# Patient Record
Sex: FEMALE | Race: WHITE | NOT HISPANIC OR LATINO | Employment: OTHER | URBAN - METROPOLITAN AREA
[De-identification: names, ages, dates, MRNs, and addresses within clinical notes are randomized per-mention and may not be internally consistent; named-entity substitution may affect disease eponyms.]

---

## 2023-08-08 ENCOUNTER — HOSPITAL ENCOUNTER (OUTPATIENT)
Dept: DATA CONVERSION | Facility: HOSPITAL | Age: 73
Discharge: HOME | End: 2023-08-08

## 2023-08-08 DIAGNOSIS — Z87.440 PERSONAL HISTORY OF URINARY (TRACT) INFECTIONS: ICD-10-CM

## 2023-08-08 DIAGNOSIS — Z79.891 LONG TERM (CURRENT) USE OF OPIATE ANALGESIC: ICD-10-CM

## 2023-08-08 DIAGNOSIS — K21.9 GASTRO-ESOPHAGEAL REFLUX DISEASE WITHOUT ESOPHAGITIS: ICD-10-CM

## 2023-08-08 DIAGNOSIS — I70.213 ATHEROSCLEROSIS OF NATIVE ARTERIES OF EXTREMITIES WITH INTERMITTENT CLAUDICATION, BILATERAL LEGS (CMS-HCC): ICD-10-CM

## 2023-08-08 DIAGNOSIS — Z79.82 LONG TERM (CURRENT) USE OF ASPIRIN: ICD-10-CM

## 2023-08-08 DIAGNOSIS — I70.1 ATHEROSCLEROSIS OF RENAL ARTERY (CMS-HCC): ICD-10-CM

## 2023-08-08 DIAGNOSIS — I25.10 ATHEROSCLEROTIC HEART DISEASE OF NATIVE CORONARY ARTERY WITHOUT ANGINA PECTORIS: ICD-10-CM

## 2023-08-08 DIAGNOSIS — F17.200 NICOTINE DEPENDENCE, UNSPECIFIED, UNCOMPLICATED: ICD-10-CM

## 2023-08-08 DIAGNOSIS — E66.9 OBESITY, UNSPECIFIED: ICD-10-CM

## 2023-08-08 DIAGNOSIS — Z79.899 OTHER LONG TERM (CURRENT) DRUG THERAPY: ICD-10-CM

## 2023-08-08 DIAGNOSIS — I86.8 VARICOSE VEINS OF OTHER SPECIFIED SITES: ICD-10-CM

## 2023-08-08 DIAGNOSIS — J44.9 CHRONIC OBSTRUCTIVE PULMONARY DISEASE, UNSPECIFIED (MULTI): ICD-10-CM

## 2023-08-08 DIAGNOSIS — Z95.5 PRESENCE OF CORONARY ANGIOPLASTY IMPLANT AND GRAFT: ICD-10-CM

## 2023-08-08 DIAGNOSIS — I12.9 HYPERTENSIVE CHRONIC KIDNEY DISEASE WITH STAGE 1 THROUGH STAGE 4 CHRONIC KIDNEY DISEASE, OR UNSPECIFIED CHRONIC KIDNEY DISEASE: ICD-10-CM

## 2023-08-08 DIAGNOSIS — I65.23 OCCLUSION AND STENOSIS OF BILATERAL CAROTID ARTERIES: ICD-10-CM

## 2023-08-08 DIAGNOSIS — L40.9 PSORIASIS, UNSPECIFIED: ICD-10-CM

## 2023-08-08 DIAGNOSIS — Z95.828 PRESENCE OF OTHER VASCULAR IMPLANTS AND GRAFTS: ICD-10-CM

## 2023-08-08 DIAGNOSIS — E78.00 PURE HYPERCHOLESTEROLEMIA, UNSPECIFIED: ICD-10-CM

## 2023-08-08 DIAGNOSIS — G62.9 POLYNEUROPATHY, UNSPECIFIED: ICD-10-CM

## 2023-08-08 DIAGNOSIS — N18.4 CHRONIC KIDNEY DISEASE, STAGE 4 (SEVERE) (MULTI): ICD-10-CM

## 2023-08-08 DIAGNOSIS — Z79.51 LONG TERM (CURRENT) USE OF INHALED STEROIDS: ICD-10-CM

## 2023-09-22 ENCOUNTER — HOSPITAL ENCOUNTER (OUTPATIENT)
Dept: DATA CONVERSION | Facility: HOSPITAL | Age: 73
Discharge: HOME | End: 2023-09-22

## 2023-09-22 DIAGNOSIS — I25.10 ATHEROSCLEROTIC HEART DISEASE OF NATIVE CORONARY ARTERY WITHOUT ANGINA PECTORIS: ICD-10-CM

## 2023-09-22 LAB — CK SERPL-CCNC: 44 U/L (ref 24–195)

## 2024-03-25 ENCOUNTER — HOSPITAL ENCOUNTER (OUTPATIENT)
Dept: RADIOLOGY | Facility: HOSPITAL | Age: 74
Discharge: HOME | End: 2024-03-25
Payer: MEDICARE

## 2024-03-25 ENCOUNTER — LAB (OUTPATIENT)
Dept: LAB | Facility: LAB | Age: 74
End: 2024-03-25
Payer: MEDICARE

## 2024-03-25 DIAGNOSIS — N18.32 CHRONIC KIDNEY DISEASE, STAGE 3B (MULTI): ICD-10-CM

## 2024-03-25 DIAGNOSIS — N18.32 CHRONIC KIDNEY DISEASE, STAGE 3B (MULTI): Primary | ICD-10-CM

## 2024-03-25 LAB
ALBUMIN SERPL-MCNC: 4.2 G/DL (ref 3.5–5)
ANION GAP SERPL CALC-SCNC: 12 MMOL/L
APPEARANCE UR: CLEAR
BACTERIA #/AREA URNS AUTO: ABNORMAL /HPF
BILIRUB UR STRIP.AUTO-MCNC: NEGATIVE MG/DL
BUN SERPL-MCNC: 22 MG/DL (ref 8–25)
CALCIUM SERPL-MCNC: 9.1 MG/DL (ref 8.5–10.4)
CHLORIDE SERPL-SCNC: 96 MMOL/L (ref 97–107)
CO2 SERPL-SCNC: 26 MMOL/L (ref 24–31)
COLOR UR: COLORLESS
CREAT SERPL-MCNC: 1.4 MG/DL (ref 0.4–1.6)
CREAT UR-MCNC: 32.1 MG/DL
EGFRCR SERPLBLD CKD-EPI 2021: 40 ML/MIN/1.73M*2
GLUCOSE SERPL-MCNC: 87 MG/DL (ref 65–99)
GLUCOSE UR STRIP.AUTO-MCNC: NORMAL MG/DL
KETONES UR STRIP.AUTO-MCNC: NEGATIVE MG/DL
LEUKOCYTE ESTERASE UR QL STRIP.AUTO: ABNORMAL
MICROALBUMIN UR-MCNC: 308 MG/L (ref 0–23)
MICROALBUMIN/CREAT UR: 959.5 UG/MG CREAT
NITRITE UR QL STRIP.AUTO: NEGATIVE
PH UR STRIP.AUTO: 6 [PH]
PHOSPHATE SERPL-MCNC: 3.8 MG/DL (ref 2.5–4.5)
POTASSIUM SERPL-SCNC: 5.4 MMOL/L (ref 3.4–5.1)
PROT UR STRIP.AUTO-MCNC: ABNORMAL MG/DL
RBC # UR STRIP.AUTO: NEGATIVE /UL
RBC #/AREA URNS AUTO: ABNORMAL /HPF
SODIUM SERPL-SCNC: 134 MMOL/L (ref 133–145)
SP GR UR STRIP.AUTO: 1.01
SQUAMOUS #/AREA URNS AUTO: ABNORMAL /HPF
UROBILINOGEN UR STRIP.AUTO-MCNC: NORMAL MG/DL
WBC #/AREA URNS AUTO: ABNORMAL /HPF

## 2024-03-25 PROCEDURE — 76770 US EXAM ABDO BACK WALL COMP: CPT

## 2024-03-25 PROCEDURE — 81001 URINALYSIS AUTO W/SCOPE: CPT

## 2024-03-25 PROCEDURE — 36415 COLL VENOUS BLD VENIPUNCTURE: CPT

## 2024-03-25 PROCEDURE — 80069 RENAL FUNCTION PANEL: CPT

## 2024-03-25 PROCEDURE — 76770 US EXAM ABDO BACK WALL COMP: CPT | Performed by: STUDENT IN AN ORGANIZED HEALTH CARE EDUCATION/TRAINING PROGRAM

## 2024-03-25 PROCEDURE — 82043 UR ALBUMIN QUANTITATIVE: CPT

## 2024-03-25 PROCEDURE — 82570 ASSAY OF URINE CREATININE: CPT

## 2024-03-26 DIAGNOSIS — R94.31 ABNORMAL EKG: ICD-10-CM

## 2024-03-26 DIAGNOSIS — I10 PRIMARY HYPERTENSION: ICD-10-CM

## 2024-03-26 RX ORDER — CLONIDINE HYDROCHLORIDE 0.1 MG/1
0.1 TABLET ORAL 3 TIMES DAILY
Qty: 270 TABLET | Refills: 3 | Status: SHIPPED | OUTPATIENT
Start: 2024-03-26 | End: 2025-03-21

## 2024-03-26 NOTE — TELEPHONE ENCOUNTER
Pharmacy is requesting a refill for the pt     Requested Prescriptions     Pending Prescriptions Disp Refills    cloNIDine (Catapres) 0.1 mg tablet 270 tablet 3     Sig: TAKE 1 TABLET BY MOUTH THREE TIMES A DAY

## 2024-04-11 ENCOUNTER — HOSPITAL ENCOUNTER (OUTPATIENT)
Dept: RADIOLOGY | Facility: HOSPITAL | Age: 74
Discharge: HOME | End: 2024-04-11
Payer: MEDICARE

## 2024-04-11 DIAGNOSIS — F17.200 NICOTINE DEPENDENCE, UNSPECIFIED, UNCOMPLICATED: ICD-10-CM

## 2024-04-11 PROCEDURE — 71271 CT THORAX LUNG CANCER SCR C-: CPT

## 2024-05-05 NOTE — PROGRESS NOTES
Subjective   Soledad Macdonald is a 73 y.o. female who presents for NPV TO ESTABLISH PCP CARE and FOR CARE GAP REVIEW.      HPI:      74 yo female CURRENT SMOKER (1/2 ppd x 1 years + 2 ppd x 50 years= 101 pack-years) who presents for NPV TO ESTABLISH PCP CARE and FOR CARE GAP REVIEW.       [facility] records reviewed.    PMHx:  HTN; followed by cardiology  CKD Stage 3 B; GFR 40 (3/25/24)  Arthrosclerosis/CAD; extremely elevated coronary calcium score Total Agatston score 2960 (CT Heart Ca scoring 3/17/23); followed by cardiology  Pulmonary nodules up to 5 mm on CT Lung cancer screening 4/11/24  Severe atherosclerotic calcification of the thoracic aorta with areas of mild aneurysmal dilatation in the arch and descending thoracic aorta on CT Lung cancer screening 4/11/24  Dilated main pulmonary artery which can be seen in pulmonary artery hypertension on CT Lung cancer screening 4/11/24  Severe Right renal atrophy    Renal US 3/25/24:  IMPRESSION:  Severe right renal atrophy.  Multiple simple left renal cysts. No renal calculi or hydronephrosis.    CT Lung Cancer Screening 4/11/24:  IMPRESSION:  1. Couple of nodules in the left lower lobe measuring up to 5 mm as  described above. Continued screening with low-dose noncontrast chest  CT in 12 months (from current date) is recommended.  2. Mild upper lung predominant emphysema.  3. Severe atherosclerotic calcification of the thoracic aorta with  areas of mild aneurysmal dilatation in the arch and descending  thoracic aorta. Recommend dedicated CT angiography of the thoracic  aorta.  4. Dilated main pulmonary artery which can be seen in pulmonary  artery hypertension.  5. Severe coronary artery calcification. Estimated coronary artery  calcium score 957. Correlate with coronary artery disease risk  factors.  6. Cholelithiasis.        Healthcare Providers:  Cardiology (primary): Dr. Nunez  Nephrology: Dr. Canchola  Vascular surgery: Dr. Gibbons  Cardiology:   "[facility]  Pulmonology: Dr. Neil   Prior PCP:  Dr Mims      Preventive Health Services:  -Last physical:  -last mammogram: ? NOW DUE  -last colonoscopy: ?  -last STI screening: ?  -Hep C screening: ?  -DEXA:  NOW DUE    CT Lung Cancer Screening 4/11/24:  IMPRESSION:  1. Couple of nodules in the left lower lobe measuring up to 5 mm as  described above. Continued screening with low-dose noncontrast chest  CT in 12 months (from current date) is recommended.  2. Mild upper lung predominant emphysema.  3. Severe atherosclerotic calcification of the thoracic aorta with  areas of mild aneurysmal dilatation in the arch and descending  thoracic aorta. Recommend dedicated CT angiography of the thoracic  aorta.  4. Dilated main pulmonary artery which can be seen in pulmonary  artery hypertension.  5. Severe coronary artery calcification. Estimated coronary artery  calcium score 957. Correlate with coronary artery disease risk  factors.  6. Cholelithiasis.    Ct Heart Ca scoring 3/17/23:  FINDINGS:  The Agatston Score as reported in this table provides a measure for  comparison with published studies (see \"Percentile Ranking\" below). The  Volume Score can be useful for comparison with follow-up examinations, and  is reported here:  2404 mm.        CORONARY ARTERY  SCORE  -----------------------------------------------------  Left Main (LM) 241  Left Anterior Descending (LAD) 597  Left Circumflex (LCX) 225  Right Coronary Artery (RCA) 1896  Total Agatston score 2960              Immunizations:   -Childhood vaccines: completed per patient    -COVID vaccine and booster:  -updated COVID spike vaccine: NOW DUE  -Flu vaccine: NOW DUE  -TDAP:  ? NOW DUE  -pneumonia: ? NOW DUE  -RSV: NOW DUE    There is no immunization history on file for this patient.      Today Soledad reports:    - doing and feeling well.     -taking all medications as prescribed with no reported adverse medication side effects      Today she has no other reported " "complaints, issues, or problems.    ROS is NEG for HEADACHE, NAUSEA, VOMITING, DIARRHEA, CHEST PAIN, SOB, and BLEEDING.  Review of systems (10+) is negative for all systems except for any identified issues in HPI above.        Objective     /80   Pulse 105   Temp 36.4 °C (97.6 °F)   Resp 22   Ht 1.676 m (5' 6\")   Wt 86.5 kg (190 lb 9.6 oz)   SpO2 96%   BMI 30.76 kg/m²      Physical Examination:       GENERAL           General Appearance: well-appearing, well-developed, well-hydrated, well-nourished, no acute distress.        HEENT           NECK supple, no masses or thyromegaly, no carotid bruit.        EYES           Extraocular Movements: normal, bilateral eyes BING, no conjunctival injection.        HEART           Rate and Rhythm regular rate and rhythm. Heart sounds: normal S1S2, no S3 or S4. Murmurs: none.        CHEST           Breath sounds: Clear to IPPA, RR<16 no use of accessory muscles.        ABDOMEN           General: Neg for LKKS or masses, no scleral icterus or jaundice.        MUSCULOSKELETAL           Joints Demonstration: Neg for erythema, swelling or joint deformities. gross abnormalities no gross abnormalities.        EXTREMITIES           Lower Extremities: Neg for cyanosis, clubbing or edema.        SKIN: Eczema plaques on hands and fingers bilaterally. No signs of cellulitis or infection      Assessment/Plan   Problem List Items Addressed This Visit    None  Visit Diagnoses       Encounter to establish care    -  Primary    Aneurysm of descending thoracic aorta without rupture (CMS-HCC)        Primary hypertension        Coronary artery disease involving native heart, unspecified vessel or lesion type, unspecified whether angina present        Relevant Medications    clopidogrel (Plavix) 75 mg tablet    Pulmonary nodules        Stage 3b chronic kidney disease (Multi)        Dilation of pulmonary artery (Multi)        Pulmonary emphysema, unspecified emphysema type (Multi)        " Calculus of gallbladder without cholecystitis without obstruction        Encounter for screening mammogram for malignant neoplasm of breast        Age related osteoporosis, unspecified pathological fracture presence        Vitamin D deficiency        Encounter for hepatitis C screening test for low risk patient        Routine screening for STI (sexually transmitted infection)        Diabetes mellitus screening        Colon cancer screening              Establish PCP care  -labs ordered (see A/P above for details)    HTN: followed by cardiology  -CMP, UA ordered  -cont BP medications and management per cardiology Dr. Thakkar  -low salt, low cholesterol, low fat diet, regularly exercise, and limit alcohol intake    Pulmonary nodules on CT Lung cancer screening 4/11/24  -referral to pulmonology ordered for evaluation and management    CKD Stage 3 B and severe right renal atrophy: followed by nephrology  -CMP, UA ordered  -cont management per nephrology  -patient counseled to AVOID NSAIDS to preserve renal function  -low salt, low cholesterol, low fat diet, regularly exercise, and limit alcohol intake    Arthrosclerosis/CAD; extremely elevated coronary calcium score : Total Agatston score 2960 (CT Heart Ca scoring 3/17/23); followed by cardiology  -cont management and medications per cardiology  -Emergency Dept and 911/EMS cardiac precautions discussed and reviewed with patient    Severe atherosclerotic calcification of the thoracic aorta with areas of mild aneurysmal dilatation in the arch and descending thoracic aorta on CT Lung cancer screening 4/11/24  -referral to cardiovascular surgery ordered  -Emergency Dept and 911/EMS cardiac precautions discussed and reviewed with patient    Dilated main pulmonary artery which can be seen in pulmonary artery hypertension on CT Lung cancer screening 4/11/24  -referral to cardiology and pulmonology ordered; patient advised to discuss with her cardiologist     Eczema:  -referral  to dermatology ordered  -cont triamcinolone as needed    Lipid Disorder screening  -lipid panel ordered    Diabetes Screening  -HgBA1c ordered    Vitamin D deficiency  -Vit D levels ordered     Hep C screening  -Hep C antibody ordered     STI Screening:  -HIV, syphilis, GC/CT, trich ordered    Breast Cancer Screening: MAMMOGRAM NOW DUE   -mammogram ordered     Colon Cancer Screening: NOW DUE   -colonoscopy ordered     Age related osteoporosis:   -DEXA ordered    Counseling:       Medication education:         Education:  The patient is counseled regarding potential side-effects of all new medications        Understanding:  Patient expressed understanding        Adherence:  No barriers to adherence identified        Immunizations Counseling  -pneumonia, shingles, and TDAP now due==> PREVNAR 20 RECEIVED TODAY   -recommend updated COVID spike vaccine that can be obtained at local pharmacy     FOLLOW-UP: 4 weeks to discuss and review test results and 8 weeks for PHYSICAL     Discussed recommended plan of care with patient. Patient expressed understanding and agreement with plan of care. All of patient's questions were answered at the time. Patient had no additional questions at the time.           Katheryn Gallegos MD, PhD

## 2024-05-05 NOTE — PATIENT INSTRUCTIONS
It was nice meeting you today.    Please go to [facility] lab or another [facility] lab facility to complete the lab testing that I ordered      Please call radiology to schedule : 1) mammogram and 2) DEXA bone density scan      Please call referral line to schedule: 1) pulmonology for pulmonary nodules and dilated pulmonary artery; 2) cardiology for dilated pulmonary artery; 3) cardiovascular surgery for thoracic artery aneurysm  And 4) dermatology for eczema. You can also call [facility] dermatology to be seen, as long as they take your insurance,     If you are seeing the above specialists please call and schedule follow up appointments with them to discuss.    Please continue to follow with nephrology for kidney impairment and cardiology for high blood pressure and cardiac issues.      I recommend receiving the pneumonia, TDAP booster that prevents tetanus and other infectious disease, shingles vaccine. You received Prevnar 20 pneumonia vaccine today    I recommend the updated COVID vaccine and RSV vaccine that can be obtained at your local pharmacy    If you develop chest pain, heart palpitations, or pass out please immediately call 911.    Please schedule a follow up with me in 4  weeks to discuss and review your test results

## 2024-05-06 ENCOUNTER — OFFICE VISIT (OUTPATIENT)
Dept: PRIMARY CARE | Facility: CLINIC | Age: 74
End: 2024-05-06
Payer: MEDICARE

## 2024-05-06 VITALS
HEART RATE: 105 BPM | OXYGEN SATURATION: 96 % | DIASTOLIC BLOOD PRESSURE: 80 MMHG | TEMPERATURE: 97.6 F | RESPIRATION RATE: 22 BRPM | SYSTOLIC BLOOD PRESSURE: 136 MMHG | HEIGHT: 66 IN | BODY MASS INDEX: 30.63 KG/M2 | WEIGHT: 190.6 LBS

## 2024-05-06 DIAGNOSIS — R53.83 OTHER FATIGUE: ICD-10-CM

## 2024-05-06 DIAGNOSIS — Z23 ENCOUNTER FOR ADMINISTRATION OF VACCINE: ICD-10-CM

## 2024-05-06 DIAGNOSIS — E55.9 VITAMIN D DEFICIENCY: ICD-10-CM

## 2024-05-06 DIAGNOSIS — Z12.31 ENCOUNTER FOR SCREENING MAMMOGRAM FOR MALIGNANT NEOPLASM OF BREAST: ICD-10-CM

## 2024-05-06 DIAGNOSIS — Z11.3 ROUTINE SCREENING FOR STI (SEXUALLY TRANSMITTED INFECTION): ICD-10-CM

## 2024-05-06 DIAGNOSIS — I10 PRIMARY HYPERTENSION: ICD-10-CM

## 2024-05-06 DIAGNOSIS — Z13.1 DIABETES MELLITUS SCREENING: ICD-10-CM

## 2024-05-06 DIAGNOSIS — I28.8 DILATION OF PULMONARY ARTERY (MULTI): ICD-10-CM

## 2024-05-06 DIAGNOSIS — K80.20 CALCULUS OF GALLBLADDER WITHOUT CHOLECYSTITIS WITHOUT OBSTRUCTION: ICD-10-CM

## 2024-05-06 DIAGNOSIS — L30.9 ECZEMA, UNSPECIFIED TYPE: ICD-10-CM

## 2024-05-06 DIAGNOSIS — Z11.59 ENCOUNTER FOR HEPATITIS C SCREENING TEST FOR LOW RISK PATIENT: ICD-10-CM

## 2024-05-06 DIAGNOSIS — Z76.89 ENCOUNTER TO ESTABLISH CARE: Primary | ICD-10-CM

## 2024-05-06 DIAGNOSIS — R73.09 ELEVATED GLUCOSE: ICD-10-CM

## 2024-05-06 DIAGNOSIS — J43.9 PULMONARY EMPHYSEMA, UNSPECIFIED EMPHYSEMA TYPE (MULTI): ICD-10-CM

## 2024-05-06 DIAGNOSIS — N18.32 STAGE 3B CHRONIC KIDNEY DISEASE (MULTI): ICD-10-CM

## 2024-05-06 DIAGNOSIS — M81.0 AGE RELATED OSTEOPOROSIS, UNSPECIFIED PATHOLOGICAL FRACTURE PRESENCE: ICD-10-CM

## 2024-05-06 DIAGNOSIS — I71.23 ANEURYSM OF DESCENDING THORACIC AORTA WITHOUT RUPTURE (CMS-HCC): ICD-10-CM

## 2024-05-06 DIAGNOSIS — I25.10 CORONARY ARTERY DISEASE INVOLVING NATIVE HEART, UNSPECIFIED VESSEL OR LESION TYPE, UNSPECIFIED WHETHER ANGINA PRESENT: ICD-10-CM

## 2024-05-06 DIAGNOSIS — R91.8 PULMONARY NODULES: ICD-10-CM

## 2024-05-06 DIAGNOSIS — Z12.11 COLON CANCER SCREENING: ICD-10-CM

## 2024-05-06 DIAGNOSIS — Z76.0 MEDICATION REFILL: ICD-10-CM

## 2024-05-06 PROCEDURE — 1159F MED LIST DOCD IN RCRD: CPT | Performed by: FAMILY MEDICINE

## 2024-05-06 PROCEDURE — 4004F PT TOBACCO SCREEN RCVD TLK: CPT | Performed by: FAMILY MEDICINE

## 2024-05-06 PROCEDURE — 1126F AMNT PAIN NOTED NONE PRSNT: CPT | Performed by: FAMILY MEDICINE

## 2024-05-06 PROCEDURE — 99214 OFFICE O/P EST MOD 30 MIN: CPT | Performed by: FAMILY MEDICINE

## 2024-05-06 PROCEDURE — 3008F BODY MASS INDEX DOCD: CPT | Performed by: FAMILY MEDICINE

## 2024-05-06 PROCEDURE — 90677 PCV20 VACCINE IM: CPT | Performed by: FAMILY MEDICINE

## 2024-05-06 PROCEDURE — 99204 OFFICE O/P NEW MOD 45 MIN: CPT | Performed by: FAMILY MEDICINE

## 2024-05-06 PROCEDURE — 3075F SYST BP GE 130 - 139MM HG: CPT | Performed by: FAMILY MEDICINE

## 2024-05-06 PROCEDURE — 3079F DIAST BP 80-89 MM HG: CPT | Performed by: FAMILY MEDICINE

## 2024-05-06 RX ORDER — ROSUVASTATIN CALCIUM 5 MG/1
5 TABLET, COATED ORAL DAILY
Qty: 90 TABLET | Refills: 3 | Status: SHIPPED | OUTPATIENT
Start: 2024-05-06 | End: 2024-05-23 | Stop reason: SDUPTHER

## 2024-05-06 RX ORDER — ROSUVASTATIN CALCIUM 5 MG/1
5 TABLET, COATED ORAL DAILY
COMMUNITY
End: 2024-05-06 | Stop reason: SDUPTHER

## 2024-05-06 RX ORDER — FLUTICASONE FUROATE, UMECLIDINIUM BROMIDE AND VILANTEROL TRIFENATATE 100; 62.5; 25 UG/1; UG/1; UG/1
1 POWDER RESPIRATORY (INHALATION) DAILY
COMMUNITY

## 2024-05-06 RX ORDER — ALPRAZOLAM 1 MG/1
1 TABLET, ORALLY DISINTEGRATING ORAL 2 TIMES DAILY
COMMUNITY

## 2024-05-06 RX ORDER — FUROSEMIDE 40 MG/1
40 TABLET ORAL 3 TIMES DAILY
COMMUNITY

## 2024-05-06 RX ORDER — CLOPIDOGREL BISULFATE 75 MG/1
75 TABLET ORAL DAILY
COMMUNITY

## 2024-05-06 ASSESSMENT — PATIENT HEALTH QUESTIONNAIRE - PHQ9
1. LITTLE INTEREST OR PLEASURE IN DOING THINGS: NOT AT ALL
SUM OF ALL RESPONSES TO PHQ9 QUESTIONS 1 AND 2: 0
2. FEELING DOWN, DEPRESSED OR HOPELESS: NOT AT ALL

## 2024-05-06 ASSESSMENT — COLUMBIA-SUICIDE SEVERITY RATING SCALE - C-SSRS
6. HAVE YOU EVER DONE ANYTHING, STARTED TO DO ANYTHING, OR PREPARED TO DO ANYTHING TO END YOUR LIFE?: NO
2. HAVE YOU ACTUALLY HAD ANY THOUGHTS OF KILLING YOURSELF?: NO
1. IN THE PAST MONTH, HAVE YOU WISHED YOU WERE DEAD OR WISHED YOU COULD GO TO SLEEP AND NOT WAKE UP?: NO

## 2024-05-06 ASSESSMENT — PAIN SCALES - GENERAL: PAINLEVEL: 0-NO PAIN

## 2024-05-06 ASSESSMENT — ENCOUNTER SYMPTOMS
OCCASIONAL FEELINGS OF UNSTEADINESS: 0
DEPRESSION: 0
LOSS OF SENSATION IN FEET: 0

## 2024-05-11 ENCOUNTER — APPOINTMENT (OUTPATIENT)
Dept: RADIOLOGY | Facility: HOSPITAL | Age: 74
End: 2024-05-11
Payer: MEDICARE

## 2024-05-11 ENCOUNTER — HOSPITAL ENCOUNTER (EMERGENCY)
Facility: HOSPITAL | Age: 74
Discharge: LEFT BEFORE DISCHARGE | End: 2024-05-11
Attending: EMERGENCY MEDICINE
Payer: MEDICARE

## 2024-05-11 ENCOUNTER — APPOINTMENT (OUTPATIENT)
Dept: CARDIOLOGY | Facility: HOSPITAL | Age: 74
End: 2024-05-11
Payer: MEDICARE

## 2024-05-11 VITALS
TEMPERATURE: 97.3 F | WEIGHT: 190 LBS | DIASTOLIC BLOOD PRESSURE: 83 MMHG | RESPIRATION RATE: 20 BRPM | HEART RATE: 130 BPM | BODY MASS INDEX: 30.53 KG/M2 | OXYGEN SATURATION: 95 % | HEIGHT: 66 IN | SYSTOLIC BLOOD PRESSURE: 159 MMHG

## 2024-05-11 DIAGNOSIS — R07.9 CHEST PAIN, UNSPECIFIED TYPE: Primary | ICD-10-CM

## 2024-05-11 LAB
ALBUMIN SERPL-MCNC: 4.3 G/DL (ref 3.5–5)
ALP BLD-CCNC: 75 U/L (ref 35–125)
ALT SERPL-CCNC: 11 U/L (ref 5–40)
ANION GAP SERPL CALC-SCNC: 16 MMOL/L
AST SERPL-CCNC: 21 U/L (ref 5–40)
BASOPHILS # BLD AUTO: 0.04 X10*3/UL (ref 0–0.1)
BASOPHILS NFR BLD AUTO: 0.6 %
BILIRUB SERPL-MCNC: 0.3 MG/DL (ref 0.1–1.2)
BUN SERPL-MCNC: 21 MG/DL (ref 8–25)
CALCIUM SERPL-MCNC: 9.8 MG/DL (ref 8.5–10.4)
CHLORIDE SERPL-SCNC: 93 MMOL/L (ref 97–107)
CO2 SERPL-SCNC: 24 MMOL/L (ref 24–31)
CREAT SERPL-MCNC: 1.5 MG/DL (ref 0.4–1.6)
EGFRCR SERPLBLD CKD-EPI 2021: 37 ML/MIN/1.73M*2
EOSINOPHIL # BLD AUTO: 0.32 X10*3/UL (ref 0–0.4)
EOSINOPHIL NFR BLD AUTO: 4.7 %
ERYTHROCYTE [DISTWIDTH] IN BLOOD BY AUTOMATED COUNT: 13.5 % (ref 11.5–14.5)
GLUCOSE SERPL-MCNC: 131 MG/DL (ref 65–99)
HCT VFR BLD AUTO: 44.7 % (ref 36–46)
HGB BLD-MCNC: 14.1 G/DL (ref 12–16)
IMM GRANULOCYTES # BLD AUTO: 0.01 X10*3/UL (ref 0–0.5)
IMM GRANULOCYTES NFR BLD AUTO: 0.1 % (ref 0–0.9)
LYMPHOCYTES # BLD AUTO: 2.18 X10*3/UL (ref 0.8–3)
LYMPHOCYTES NFR BLD AUTO: 32.3 %
MCH RBC QN AUTO: 29.7 PG (ref 26–34)
MCHC RBC AUTO-ENTMCNC: 31.5 G/DL (ref 32–36)
MCV RBC AUTO: 94 FL (ref 80–100)
MONOCYTES # BLD AUTO: 0.49 X10*3/UL (ref 0.05–0.8)
MONOCYTES NFR BLD AUTO: 7.3 %
NEUTROPHILS # BLD AUTO: 3.71 X10*3/UL (ref 1.6–5.5)
NEUTROPHILS NFR BLD AUTO: 55 %
NRBC BLD-RTO: 0 /100 WBCS (ref 0–0)
NT-PROBNP SERPL-MCNC: 381 PG/ML (ref 0–353)
PLATELET # BLD AUTO: 213 X10*3/UL (ref 150–450)
POTASSIUM SERPL-SCNC: 3.5 MMOL/L (ref 3.4–5.1)
PROT SERPL-MCNC: 8 G/DL (ref 5.9–7.9)
RBC # BLD AUTO: 4.75 X10*6/UL (ref 4–5.2)
SODIUM SERPL-SCNC: 133 MMOL/L (ref 133–145)
TROPONIN T SERPL-MCNC: 20 NG/L
WBC # BLD AUTO: 6.8 X10*3/UL (ref 4.4–11.3)

## 2024-05-11 PROCEDURE — 84484 ASSAY OF TROPONIN QUANT: CPT | Performed by: EMERGENCY MEDICINE

## 2024-05-11 PROCEDURE — 2500000004 HC RX 250 GENERAL PHARMACY W/ HCPCS (ALT 636 FOR OP/ED): Performed by: EMERGENCY MEDICINE

## 2024-05-11 PROCEDURE — 85025 COMPLETE CBC W/AUTO DIFF WBC: CPT | Performed by: EMERGENCY MEDICINE

## 2024-05-11 PROCEDURE — 96361 HYDRATE IV INFUSION ADD-ON: CPT

## 2024-05-11 PROCEDURE — 94640 AIRWAY INHALATION TREATMENT: CPT

## 2024-05-11 PROCEDURE — 36415 COLL VENOUS BLD VENIPUNCTURE: CPT | Performed by: EMERGENCY MEDICINE

## 2024-05-11 PROCEDURE — 93005 ELECTROCARDIOGRAM TRACING: CPT

## 2024-05-11 PROCEDURE — 71045 X-RAY EXAM CHEST 1 VIEW: CPT | Performed by: RADIOLOGY

## 2024-05-11 PROCEDURE — 71275 CT ANGIOGRAPHY CHEST: CPT

## 2024-05-11 PROCEDURE — 96375 TX/PRO/DX INJ NEW DRUG ADDON: CPT | Mod: 59

## 2024-05-11 PROCEDURE — 71045 X-RAY EXAM CHEST 1 VIEW: CPT

## 2024-05-11 PROCEDURE — 80053 COMPREHEN METABOLIC PANEL: CPT | Performed by: EMERGENCY MEDICINE

## 2024-05-11 PROCEDURE — 96374 THER/PROPH/DIAG INJ IV PUSH: CPT | Mod: 59

## 2024-05-11 PROCEDURE — 2550000001 HC RX 255 CONTRASTS: Performed by: EMERGENCY MEDICINE

## 2024-05-11 PROCEDURE — 2500000002 HC RX 250 W HCPCS SELF ADMINISTERED DRUGS (ALT 637 FOR MEDICARE OP, ALT 636 FOR OP/ED): Performed by: EMERGENCY MEDICINE

## 2024-05-11 PROCEDURE — 99285 EMERGENCY DEPT VISIT HI MDM: CPT | Mod: 25

## 2024-05-11 PROCEDURE — 71275 CT ANGIOGRAPHY CHEST: CPT | Performed by: RADIOLOGY

## 2024-05-11 PROCEDURE — 83880 ASSAY OF NATRIURETIC PEPTIDE: CPT | Performed by: EMERGENCY MEDICINE

## 2024-05-11 RX ORDER — IPRATROPIUM BROMIDE AND ALBUTEROL SULFATE 2.5; .5 MG/3ML; MG/3ML
3 SOLUTION RESPIRATORY (INHALATION) ONCE
Status: COMPLETED | OUTPATIENT
Start: 2024-05-11 | End: 2024-05-11

## 2024-05-11 RX ORDER — ONDANSETRON HYDROCHLORIDE 2 MG/ML
4 INJECTION, SOLUTION INTRAVENOUS ONCE
Status: COMPLETED | OUTPATIENT
Start: 2024-05-11 | End: 2024-05-11

## 2024-05-11 RX ORDER — MORPHINE SULFATE 4 MG/ML
4 INJECTION, SOLUTION INTRAMUSCULAR; INTRAVENOUS ONCE
Status: COMPLETED | OUTPATIENT
Start: 2024-05-11 | End: 2024-05-11

## 2024-05-11 RX ORDER — FAMOTIDINE 10 MG/ML
20 INJECTION INTRAVENOUS ONCE
Status: COMPLETED | OUTPATIENT
Start: 2024-05-11 | End: 2024-05-11

## 2024-05-11 RX ORDER — ASPIRIN 325 MG
325 TABLET ORAL ONCE
Status: DISCONTINUED | OUTPATIENT
Start: 2024-05-11 | End: 2024-05-12 | Stop reason: HOSPADM

## 2024-05-11 RX ADMIN — MORPHINE SULFATE 4 MG: 4 INJECTION, SOLUTION INTRAMUSCULAR; INTRAVENOUS at 20:34

## 2024-05-11 RX ADMIN — SODIUM CHLORIDE 500 ML: 900 INJECTION, SOLUTION INTRAVENOUS at 20:35

## 2024-05-11 RX ADMIN — ONDANSETRON 4 MG: 2 INJECTION INTRAMUSCULAR; INTRAVENOUS at 20:35

## 2024-05-11 RX ADMIN — FAMOTIDINE 20 MG: 10 INJECTION INTRAVENOUS at 20:34

## 2024-05-11 RX ADMIN — IOHEXOL 75 ML: 350 INJECTION, SOLUTION INTRAVENOUS at 21:19

## 2024-05-11 RX ADMIN — IPRATROPIUM BROMIDE AND ALBUTEROL SULFATE 3 ML: 2.5; .5 SOLUTION RESPIRATORY (INHALATION) at 20:35

## 2024-05-11 ASSESSMENT — HEART SCORE
ECG: NORMAL
TROPONIN: 1-3 TIMES NORMAL LIMIT
HISTORY: SLIGHTLY SUSPICIOUS
RISK FACTORS: >2 RISK FACTORS OR HX OF ATHEROSCLEROTIC DISEASE
AGE: 65+
HEART SCORE: 5

## 2024-05-11 ASSESSMENT — PAIN DESCRIPTION - LOCATION: LOCATION: CHEST

## 2024-05-11 ASSESSMENT — PAIN DESCRIPTION - DESCRIPTORS
DESCRIPTORS: PRESSURE;HEAVINESS
DESCRIPTORS: PRESSURE

## 2024-05-11 ASSESSMENT — PAIN DESCRIPTION - FREQUENCY: FREQUENCY: CONSTANT/CONTINUOUS

## 2024-05-11 ASSESSMENT — COLUMBIA-SUICIDE SEVERITY RATING SCALE - C-SSRS
6. HAVE YOU EVER DONE ANYTHING, STARTED TO DO ANYTHING, OR PREPARED TO DO ANYTHING TO END YOUR LIFE?: NO
1. IN THE PAST MONTH, HAVE YOU WISHED YOU WERE DEAD OR WISHED YOU COULD GO TO SLEEP AND NOT WAKE UP?: NO
2. HAVE YOU ACTUALLY HAD ANY THOUGHTS OF KILLING YOURSELF?: NO

## 2024-05-11 ASSESSMENT — PAIN DESCRIPTION - PAIN TYPE: TYPE: ACUTE PAIN

## 2024-05-11 ASSESSMENT — PAIN DESCRIPTION - ONSET: ONSET: PROGRESSIVE

## 2024-05-11 ASSESSMENT — PAIN DESCRIPTION - PROGRESSION: CLINICAL_PROGRESSION: NOT CHANGED

## 2024-05-11 ASSESSMENT — PAIN SCALES - GENERAL: PAINLEVEL_OUTOF10: 6

## 2024-05-11 ASSESSMENT — PAIN - FUNCTIONAL ASSESSMENT: PAIN_FUNCTIONAL_ASSESSMENT: 0-10

## 2024-05-12 NOTE — PROGRESS NOTES
Attestation/Supervisory note for JACEK Mishra      The patient is a 73-year-old female presenting to the emergency department for evaluation of substernal chest pain.  She states that has been fluctuating in intensity all day.  No specific better or worse.  No radiation.  She states that she does have some shortness of breath but that is chronic for her because she has emphysema.  She continues to smoke daily.  She denies any sick contacts or recent travel.  No fever or chills.  No headache or visual changes.  No abdominal pain.  No nausea vomiting.  No diarrhea or constipation but no urinary complaints.  No fever or chills.  No recent travel or immobility.  No recent surgery.  She denies any history of CAD or ACS.  No history of PE or DVT.  No history of diabetes.  She states that she does have a history of hypertension and hyperlipidemia.  All pertinent positives and negatives are recorded above.  All other systems reviewed and otherwise negative.  Vital signs with tachycardia and hypertension but otherwise within normal limits.  Physical exam with a well-nourished well-developed female in no acute distress.  HEENT exam with dry mucous membranes but otherwise unremarkable.  She has no evidence of airway compromise or respiratory distress.  Abdominal exam is benign.  She has no gross motor, neurologic or vascular deficits on exam.  She does have bilateral lower extremity pitting edema.  Pulses are equal in all 4 extremities.      EKG with sinus tachycardia 130 bpm, normal axis, normal voltage, normal ST segment, normal T waves.  There are occasional PVCs      Oral aspirin, IV fluids, IV Pepcid, IV morphine, IV Zofran and duoneb ordered.      Diagnostic labs with mild electrolyte imbalance and borderline troponin T but otherwise unremarkable.      Initial Troponin T 20. Repeat trop T pending at the time of my departure      Heart score 4      XR chest 1 view   Final Result   No airspace consolidation or pleural  effusion.        MACRO:   None        Signed by: Yosef Christiansoncher 5/11/2024 9:26 PM   Dictation workstation:   [id number]      CT angio chest for pulmonary embolism    (Results Pending)        The patient does not have any evidence of ischemia/STEMI on EKG but the troponin is borderline.  Repeat troponin T is pending at the time of my departure.  The patient did not have any events on telemetry other than sinus tachycardia.  Chest x-ray without acute process.  No evidence of pneumonia or pneumothorax.  No evidence of CHF.  CT chest was in process at the time of my departure.      JACEK Mishra will continue manage the patient primarily.  Anticipate disposition based on the results of the CT chest and repeat troponin T.  Patient will need to be admitted for further management and trending of her cardiac enzymes if the CT chest does not show any indication for transfer.      Impression/diagnosis  Chest pain, substernal  Hypertension, unspecified  Electrolyte imbalance      Critical care time billing is not warranted at this time        I personally saw the patient and made/approve the management plan and take responsibility for the patient management.      I independently interpreted the following study (S): EKG and diagnostic labs      I personally discussed the patient's management with the patient      I reviewed the results of the diagnostic labs and diagnostic imaging.  Formal radiology read was completed by the radiologist.      Viki Beltre MD

## 2024-05-12 NOTE — DISCHARGE INSTRUCTIONS
Thank you for choosing [facility] and [facility]  for your emergency care.    Please return to the Emergency Department immediately if new or worsening symptoms occur. Symptoms of that are most concerning include worsening chest pain, shortness of breath, lightheadedness or dizziness.    It is important to remember that your care does not end here and you must continue to monitor your condition closely. Please return to the emergency department for any worsening or concerning signs or symptoms as directed by our conversations and the discharge instructions. Otherwise please follow up with your doctor in 2 days if no better or worse. If you do not have a doctor please contact the referral number on your discharge instructions. Please contact any physician specialists provided in your discharge notes as it is very important to follow up with them regarding your condition. If you are unable to reach the physicians provided, please come back to the Emergency Department at any time.      As always, please take medications as directed. If you have any questions at all regarding your medications, please contact the pharmacist, the emergency department, or your doctor. Before taking any medication prescribed in the Emergency Department, please review the medication side effects and drug interactions as they may interact with your home medications.     Education materials have been provided to you about your encounter today.  Please review the attachments at your earliest convenience.

## 2024-05-12 NOTE — ED NOTES
Pt expressed to nurse that she decided to leave AMA because tomorrow is mothers day and she does not want to spend mothers day in the hospital. Pt stated that she will follow up with cardiology on Monday.     Salvador Argueta RN  05/11/24 [id number]

## 2024-05-12 NOTE — ED PROVIDER NOTES
HPI   Chief Complaint   Patient presents with    Chest Pain     Chest pain off and on all day, just got worse about 5 minutes ago. Hx. Heart conditions. And stent placement.       HPI     Patient is a 73-year-old female with a history of hypertension and hyperlipidemia presenting for evaluation of substernal chest pain.  Patient states the pain has been on and off all day.  She admits to associated shortness of breath but states that she is always short of breath because she has emphysema.  He is a current tobacco user.  Nothing makes the pain better or worse.  She has not taken anything for the pain.  Patient denies associated abdominal pain, nausea, vomiting, diarrhea or constipation.  No fevers or chills.  No known sick contacts.  No history of PE or DVT.  No history of diabetes.               [place] Coma Scale Score: 15   HEART Score: 5                   Patient History   No past medical history on file.  No past surgical history on file.  No family history on file.  Social History     Tobacco Use    Smoking status: Every Day     Current packs/day: 0.50     Average packs/day: 0.5 packs/day for 50.0 years (25.0 ttl pk-yrs)     Types: Cigarettes     Start date: 5/6/1974    Smokeless tobacco: Never   Substance Use Topics    Alcohol use: Yes     Comment: occassionally    Drug use: Never       Physical Exam   ED Triage Vitals [05/11/24 2012]   Temperature Heart Rate Respirations BP   36.3 °C (97.3 °F) (!) 130 20 159/83      Pulse Ox Temp Source Heart Rate Source Patient Position   95 % Tympanic -- Sitting      BP Location FiO2 (%)     Left arm --       Physical Exam  Vitals and nursing note reviewed.   Constitutional:       Appearance: Normal appearance.   HENT:      Head: Normocephalic and atraumatic.   Eyes:      Extraocular Movements: Extraocular movements intact.      Pupils: Pupils are equal, round, and reactive to light.   Cardiovascular:      Rate and Rhythm: Normal rate and regular rhythm.   Pulmonary:       Effort: Pulmonary effort is normal.      Breath sounds: Normal breath sounds.   Abdominal:      General: Abdomen is flat. Bowel sounds are normal.      Palpations: Abdomen is soft.   Musculoskeletal:         General: Normal range of motion.      Cervical back: Normal range of motion and neck supple.      Comments: Bilateral +1 pitting edema to the lower extremities.   Skin:     General: Skin is warm and dry.   Neurological:      Mental Status: She is alert.         ED Course & MDM   Diagnoses as of 05/11/24 2239   Chest pain, unspecified type       Medical Decision Making  Parts of this chart have been completed using voice recognition software. Please excuse any errors of transcription. Despite the medical decision making time stamp above-my medical decision making has taken place during the patient's entire visit. My thought process and reason for plan has been formulated from the time that I saw the patient until the time of disposition and is not specific to one specific moment during their visit and furthermore my MDM encompasses this entire chart and not only this text box.      HPI: Detailed above.    Exam: A medically appropriate exam performed, outlined above, given the known history and presentation.    History obtained from: Patient    Social Determinants of Health considered during this visit: Coming from home    EKG interpreted by my attending physician, reviewed by myself.    Labs Reviewed   N-TERMINAL PROBNP - Abnormal       Result Value    PROBNP 381 (*)     Narrative:     Reference ranges are based on clinical submission data. These ranges represent the 95th percentile of normal cut-off points. As NT Pro- BNP values approach 1000 pg/ml, clinical symptoms are more likely associated with CHF.   CBC WITH AUTO DIFFERENTIAL - Abnormal    WBC 6.8      nRBC 0.0      RBC 4.75      Hemoglobin 14.1      Hematocrit 44.7      MCV 94      MCH 29.7      MCHC 31.5 (*)     RDW 13.5      Platelets 213       Neutrophils % 55.0      Immature Granulocytes %, Automated 0.1      Lymphocytes % 32.3      Monocytes % 7.3      Eosinophils % 4.7      Basophils % 0.6      Neutrophils Absolute 3.71      Immature Granulocytes Absolute, Automated 0.01      Lymphocytes Absolute 2.18      Monocytes Absolute 0.49      Eosinophils Absolute 0.32      Basophils Absolute 0.04     COMPREHENSIVE METABOLIC PANEL - Abnormal    Glucose 131 (*)     Sodium 133      Potassium 3.5      Chloride 93 (*)     Bicarbonate 24      Urea Nitrogen 21      Creatinine 1.50      eGFR 37 (*)     Calcium 9.8      Albumin 4.3      Alkaline Phosphatase 75      Total Protein 8.0 (*)     AST 21      Bilirubin, Total 0.3      ALT 11      Anion Gap 16     SERIAL TROPONIN, INITIAL ([facility]) - Abnormal    Troponin T, High Sensitivity 20 (*)    TROPONIN T SERIES, HIGH SENSITIVITY (0, 2 HR, 6 HR)    Narrative:     The following orders were created for panel order Troponin T Series, High Sensitivity (0, 2HR, 6HR).  Procedure                               Abnormality         Status                     ---------                               -----------         ------                     Serial Troponin, Initial...[[id number]]  Abnormal            Final result               Serial Troponin, 2 Hour ...[[id number]]                                                   Please view results for these tests on the individual orders.   SERIAL TROPONIN,  2 HOUR ([facility])     CT angio chest for pulmonary embolism   Final Result   1. No acute pulmonary embolism.   2. Web-like filling defect within a posterior right lower lobe   branching point concerning for chronic thromboembolic disease.   Dilated main pulmonary artery suggesting chronic thromboembolic   pulmonary hypertension.   3. 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   Continued surveillance is suggested.   4. Unchanged pulmonary nodules. 12 month follow-up screening chest CT   is recommended.   5. Cholelithiasis.    6. Partially visualized right kidney demonstrates severe atrophy.        Signed by: Sagar Tom 5/11/2024 10:20 PM   Dictation workstation:   [id number]      XR chest 1 view   Final Result   No airspace consolidation or pleural effusion.        MACRO:   None        Signed by: Yosef Rivas 5/11/2024 9:26 PM   Dictation workstation:   [id number]        Medications   aspirin tablet 325 mg (325 mg oral Not Given 5/11/24 2015)   famotidine PF (Pepcid) injection 20 mg (20 mg intravenous Given 5/11/24 2034)   sodium chloride 0.9 % bolus 500 mL (0 mL intravenous Stopped 5/11/24 2135)   ondansetron (Zofran) injection 4 mg (4 mg intravenous Given 5/11/24 2035)   morphine injection 4 mg (4 mg intravenous Given 5/11/24 2034)   ipratropium-albuteroL (Duo-Neb) 0.5-2.5 mg/3 mL nebulizer solution 3 mL (3 mL nebulization Given 5/11/24 2035)   iohexol (OMNIPaque) 350 mg iodine/mL solution 75 mL (75 mL intravenous Given 5/11/24 2119)     Differential diagnoses considered for this visit include: Acute coronary syndrome versus STEMI versus NSTEMI versus pulmonary embolism    Considerations/further MDM:    Patient is a 73-year-old female with a history of hypertension, hypercholesterolemia and CAD with stent placement presenting for evaluation of chest pain.  CBC and CMP were within normal limits.  proBNP of 381.  Initial troponin of 20. Chest x-ray showed no airspace consolidation or pleural effusion.  CT angio chest for pulmonary embolism was negative for acute pulmonary embolus.    I discussed the patient's results with her.  Patient did have a heart score of 5, and I recommended admission to the hospital for further cardiac workup and observation.  Patient did not want to stay in the hospital as she stated that there was something that anyone could do for her over the weekend.  She is choosing to leave AGAINST MEDICAL ADVICE.  We discussed the nature and purpose, risks and benefits, as well as, the alternatives of the  given diagnosis and concerns. Time was given to allow the opportunity to ask questions and consider their options, and after the discussion, the patient decided to refuse the offered treatment plan. The patient was informed that refusal could lead to, but was not limited to, death, permanent disability, or severe pain, loss of current lifestyles and abilities. If present, I asked the relatives or significant others to dissuade them without success. Prior to refusing, their nurse and I determined and agreed that the patient had the capacity to make their decision and understood the consequences of that decision. They signed the refusal of treatment form and their nurse signed the form agreeing that the patient/guardian had received informed consent. After refusal, I made every reasonable opportunity to treat them to the best of my ability while still trying to accommodate the patient´s wishes and desires.    Patient was seen in conjunction with attending physician Dr. Viki Beltre.   Patient's history, physical exam, diagnostic studies, and treatment plan were discussed thoroughly.    Procedure  Procedures     Rylee Mishra PA-C  05/11/24 4022

## 2024-05-13 LAB
ATRIAL RATE: 130 BPM
P AXIS: 70 DEGREES
P OFFSET: 201 MS
P ONSET: 143 MS
PR INTERVAL: 144 MS
Q ONSET: 215 MS
QRS COUNT: 21 BEATS
QRS DURATION: 86 MS
QT INTERVAL: 320 MS
QTC CALCULATION(BAZETT): 470 MS
QTC FREDERICIA: 414 MS
R AXIS: 72 DEGREES
T AXIS: 74 DEGREES
T OFFSET: 375 MS
VENTRICULAR RATE: 130 BPM

## 2024-05-17 ENCOUNTER — HOSPITAL ENCOUNTER (OUTPATIENT)
Dept: RADIOLOGY | Facility: HOSPITAL | Age: 74
Discharge: HOME | End: 2024-05-17
Payer: MEDICARE

## 2024-05-17 VITALS — WEIGHT: 190 LBS | BODY MASS INDEX: 29.82 KG/M2 | HEIGHT: 67 IN

## 2024-05-17 DIAGNOSIS — Z12.31 ENCOUNTER FOR SCREENING MAMMOGRAM FOR MALIGNANT NEOPLASM OF BREAST: ICD-10-CM

## 2024-05-17 DIAGNOSIS — M81.0 AGE RELATED OSTEOPOROSIS, UNSPECIFIED PATHOLOGICAL FRACTURE PRESENCE: ICD-10-CM

## 2024-05-17 PROCEDURE — 77080 DXA BONE DENSITY AXIAL: CPT

## 2024-05-17 PROCEDURE — 77067 SCR MAMMO BI INCL CAD: CPT

## 2024-05-17 PROCEDURE — 77067 SCR MAMMO BI INCL CAD: CPT | Performed by: RADIOLOGY

## 2024-05-17 PROCEDURE — 77063 BREAST TOMOSYNTHESIS BI: CPT | Performed by: RADIOLOGY

## 2024-05-23 ENCOUNTER — OFFICE VISIT (OUTPATIENT)
Dept: CARDIOLOGY | Facility: CLINIC | Age: 74
End: 2024-05-23
Payer: MEDICARE

## 2024-05-23 VITALS
OXYGEN SATURATION: 96 % | RESPIRATION RATE: 18 BRPM | SYSTOLIC BLOOD PRESSURE: 138 MMHG | HEIGHT: 67 IN | BODY MASS INDEX: 30.29 KG/M2 | DIASTOLIC BLOOD PRESSURE: 80 MMHG | WEIGHT: 193 LBS | HEART RATE: 64 BPM

## 2024-05-23 DIAGNOSIS — I73.9 PERIPHERAL VASCULAR DISEASE (CMS-HCC): ICD-10-CM

## 2024-05-23 DIAGNOSIS — I25.118 CORONARY ARTERY DISEASE OF NATIVE ARTERY OF NATIVE HEART WITH STABLE ANGINA PECTORIS (CMS-HCC): ICD-10-CM

## 2024-05-23 DIAGNOSIS — I10 HYPERTENSION, UNSPECIFIED TYPE: ICD-10-CM

## 2024-05-23 DIAGNOSIS — R09.89 BILATERAL CAROTID BRUITS: ICD-10-CM

## 2024-05-23 DIAGNOSIS — E78.5 HYPERLIPIDEMIA, UNSPECIFIED HYPERLIPIDEMIA TYPE: ICD-10-CM

## 2024-05-23 DIAGNOSIS — I28.8 DILATION OF PULMONARY ARTERY (MULTI): ICD-10-CM

## 2024-05-23 DIAGNOSIS — I70.1 RENAL ARTERY STENOSIS (CMS-HCC): ICD-10-CM

## 2024-05-23 PROCEDURE — 3074F SYST BP LT 130 MM HG: CPT | Performed by: INTERNAL MEDICINE

## 2024-05-23 PROCEDURE — 1159F MED LIST DOCD IN RCRD: CPT | Performed by: INTERNAL MEDICINE

## 2024-05-23 PROCEDURE — 3008F BODY MASS INDEX DOCD: CPT | Performed by: INTERNAL MEDICINE

## 2024-05-23 PROCEDURE — 99204 OFFICE O/P NEW MOD 45 MIN: CPT | Performed by: INTERNAL MEDICINE

## 2024-05-23 PROCEDURE — 3078F DIAST BP <80 MM HG: CPT | Performed by: INTERNAL MEDICINE

## 2024-05-23 PROCEDURE — 99214 OFFICE O/P EST MOD 30 MIN: CPT | Performed by: INTERNAL MEDICINE

## 2024-05-23 RX ORDER — ATENOLOL 50 MG/1
1 TABLET ORAL DAILY
COMMUNITY

## 2024-05-23 RX ORDER — ROSUVASTATIN CALCIUM 40 MG/1
40 TABLET, COATED ORAL DAILY
Qty: 30 TABLET | Refills: 11 | Status: SHIPPED | OUTPATIENT
Start: 2024-05-23 | End: 2025-05-23

## 2024-05-23 RX ORDER — TRIAMCINOLONE ACETONIDE 1 MG/G
CREAM TOPICAL
COMMUNITY
Start: 2023-10-11

## 2024-05-23 RX ORDER — TRAMADOL HYDROCHLORIDE 50 MG/1
50 TABLET ORAL 2 TIMES DAILY PRN
COMMUNITY

## 2024-05-23 RX ORDER — ALBUTEROL SULFATE 90 UG/1
AEROSOL, METERED RESPIRATORY (INHALATION)
COMMUNITY

## 2024-05-23 RX ORDER — AMLODIPINE BESYLATE 10 MG/1
10 TABLET ORAL DAILY
Qty: 30 TABLET | Refills: 11 | Status: SHIPPED | OUTPATIENT
Start: 2024-05-23 | End: 2025-05-23

## 2024-05-23 RX ORDER — ASPIRIN 81 MG/1
TABLET ORAL EVERY 24 HOURS
COMMUNITY

## 2024-05-23 ASSESSMENT — ENCOUNTER SYMPTOMS: DEPRESSION: 0

## 2024-05-23 ASSESSMENT — PATIENT HEALTH QUESTIONNAIRE - PHQ9
SUM OF ALL RESPONSES TO PHQ9 QUESTIONS 1 AND 2: 0
2. FEELING DOWN, DEPRESSED OR HOPELESS: NOT AT ALL
1. LITTLE INTEREST OR PLEASURE IN DOING THINGS: NOT AT ALL

## 2024-06-02 NOTE — PROGRESS NOTES
Primary Care Physician: Katheryn Gallegos MD PhD  Date of Visit: 05/23/2024  1:15 PM EDT  Location of visit: [address]     Chief Complaint:   Chief Complaint   Patient presents with    New Patient Visit    dilation pulm arterty     HPI / Summary:   Soledad Macdonald is a 73 y.o. female presents for new patient    ROS    Medical History:   She has no past medical history on file.  Surgical Hx:   She has no past surgical history on file.   Social Hx:   She reports that she has been smoking cigarettes. She started smoking about 50 years ago. She has a 25 pack-year smoking history. She has never used smokeless tobacco. She reports current alcohol use. She reports that she does not use drugs.  Family Hx:   Her family history includes Breast cancer (age of onset: 45) in her sister; Breast cancer (age of onset: 47) in her daughter; Breast cancer (age of onset: 81) in her maternal grandmother.   Allergies:  Allergies   Allergen Reactions    Ticagrelor Anaphylaxis    Bactrim [Sulfamethoxazole-Trimethoprim] Unknown    Effexor [Venlafaxine] Cardiac arrhythmia/arrest     Outpatient Medications:  Current Outpatient Medications   Medication Instructions    albuterol (Ventolin HFA) 90 mcg/actuation inhaler Inhale 2 puffs every 6 hours by inhalation route as needed.    ALPRAZolam (NIRAVAM) 1 mg, oral, 2 times daily    amLODIPine (NORVASC) 10 mg, oral, Daily    aspirin 81 mg EC tablet Every 24 hours    atenolol (Tenormin) 50 mg tablet 1 tablet, oral, Daily    cloNIDine (Catapres) 0.1 mg tablet TAKE 1 TABLET BY MOUTH THREE TIMES A DAY    clopidogrel (PLAVIX) 75 mg, oral, Daily    fluticasone-umeclidin-vilanter (Trelegy Ellipta) 100-62.5-25 mcg blister with device 1 puff, inhalation, Daily    furosemide (LASIX) 40 mg, oral, 3 times daily    rosuvastatin (CRESTOR) 40 mg, oral, Daily    traMADol (ULTRAM) 50 mg, oral, 2 times daily PRN    triamcinolone (Kenalog) 0.1 % cream APPLY TO RASH/PSORIASIS PATCHES TWICE DAILY FOR 2 WEEKS THEN AS  "NEEDED FOR FLARE UPS     Physical Exam:  Vitals:    05/23/24 1308 05/23/24 1415   BP: 124/66 138/80   BP Location: Left arm    Patient Position: Sitting    BP Cuff Size: Large adult    Pulse: 67 64   Resp: 18    SpO2: 96%    Weight: 87.5 kg (193 lb)    Height: 1.702 m (5' 7\")      Wt Readings from Last 5 Encounters:   05/23/24 87.5 kg (193 lb)   05/17/24 86.2 kg (190 lb)   05/11/24 86.2 kg (190 lb)   05/06/24 86.5 kg (190 lb 9.6 oz)   06/13/23 82.6 kg (182 lb)     Physical Exam  JVP not elevated. Carotid impulses are 2+ without overlying bruit.   Chest exhibits fair to good air movement with completely clear breath sounds.   The cardiac rhythm is regular with no premature beats.   Normal S1 and S2. No gallop, murmur or rub, or click.   Abdomen is soft and benign without focal tenderness.   With no lower leg edema. The pedal pulses are intact.     Last Labs:  Admission on 05/11/2024, Discharged on 05/11/2024   Component Date Value    PROBNP 05/11/2024 381 (H)     Ventricular Rate 05/11/2024 130     Atrial Rate 05/11/2024 130     NV Interval 05/11/2024 144     QRS Duration 05/11/2024 86     QT Interval 05/11/2024 320     QTC Calculation(Bazett) 05/11/2024 470     P Axis 05/11/2024 70     R [place] 05/11/2024 72     T Axis 05/11/2024 74     QRS Count 05/11/2024 21     Q Onset 05/11/2024 215     P Onset 05/11/2024 143     P Offset 05/11/2024 201     T Offset 05/11/2024 375     QTC [place] 05/11/2024 414     WBC 05/11/2024 6.8     nRBC 05/11/2024 0.0     RBC 05/11/2024 4.75     Hemoglobin 05/11/2024 14.1     Hematocrit 05/11/2024 44.7     MCV 05/11/2024 94     MCH 05/11/2024 29.7     MCHC 05/11/2024 31.5 (L)     RDW 05/11/2024 13.5     Platelets 05/11/2024 213     Neutrophils % 05/11/2024 55.0     Immature Granulocytes %,* 05/11/2024 0.1     Lymphocytes % 05/11/2024 32.3     Monocytes % 05/11/2024 7.3     Eosinophils % 05/11/2024 4.7     Basophils % 05/11/2024 0.6     Neutrophils Absolute 05/11/2024 3.71     Immature " Granulocytes Ab* 05/11/2024 0.01     Lymphocytes Absolute 05/11/2024 2.18     Monocytes Absolute 05/11/2024 0.49     Eosinophils Absolute 05/11/2024 0.32     Basophils Absolute 05/11/2024 0.04     Glucose 05/11/2024 131 (H)     Sodium 05/11/2024 133     Potassium 05/11/2024 3.5     Chloride 05/11/2024 93 (L)     Bicarbonate 05/11/2024 24     Urea Nitrogen 05/11/2024 21     Creatinine 05/11/2024 1.50     eGFR 05/11/2024 37 (L)     Calcium 05/11/2024 9.8     Albumin 05/11/2024 4.3     Alkaline Phosphatase 05/11/2024 75     Total Protein 05/11/2024 8.0 (H)     AST 05/11/2024 21     Bilirubin, Total 05/11/2024 0.3     ALT 05/11/2024 11     Anion Gap 05/11/2024 16     Troponin T, High Sensiti* 05/11/2024 20 ()    Lab on 03/25/2024   Component Date Value    Albumin, Urine Random 03/25/2024 308.0 (H)     Creatinine, Urine Random 03/25/2024 32.1     Albumin/Creatine Ratio 03/25/2024 959.5     Color, Urine 03/25/2024 Colorless (N)     Appearance, Urine 03/25/2024 Clear     Specific Gravity, Urine 03/25/2024 1.006     pH, Urine 03/25/2024 6.0     Protein, Urine 03/25/2024 30 (1+) (A)     Glucose, Urine 03/25/2024 Normal     Blood, Urine 03/25/2024 NEGATIVE     Ketones, Urine 03/25/2024 NEGATIVE     Bilirubin, Urine 03/25/2024 NEGATIVE     Urobilinogen, Urine 03/25/2024 Normal     Nitrite, Urine 03/25/2024 NEGATIVE     Leukocyte Esterase, Urine 03/25/2024 500 Jonelle/µL (A)     Glucose 03/25/2024 87     Sodium 03/25/2024 134     Potassium 03/25/2024 5.4 (H)     Chloride 03/25/2024 96 (L)     Bicarbonate 03/25/2024 26     Urea Nitrogen 03/25/2024 22     Creatinine 03/25/2024 1.40     eGFR 03/25/2024 40 (L)     Calcium 03/25/2024 9.1     Phosphorus 03/25/2024 3.8     Albumin 03/25/2024 4.2     Anion Gap 03/25/2024 12     WBC, Urine 03/25/2024 1-5     RBC, Urine 03/25/2024 1-2     Squamous Epithelial Cell* 03/25/2024 1-9 (SPARSE)     Bacteria, Urine 03/25/2024 1+ (A)         Assessment/Plan   1.  Coronary artery disease status post  PCI to the RCA 7/7/2023 [facility]..  The patient is an early elderly white female who moved from [place] approximately 2 years ago.  She does have coronary artery disease with a CT coronary calcium score of 2960.  The patient underwent cardiac catheterization on 6/22/2023.  The LMCA was moderately calcified with a 40% distal taper just before bifurcation.  The LAD was a tortuous vessel heavily calcified in the proximal portion with a 20% ostial narrowing with otherwise no significant disease.  The diagonal branch had 2 subbranches the more inferior of which had a 90% focal mid vessel stenosis for which medical management was recommended.  The nondominant LCx had a 100% late mid to distal occlusion but with left to left collaterals not amenable to intervention.  The ramus intermedius was relatively small no significant disease.  The RCA was very tortuous heavily calcified in the proximal and midportion and had a severe 80% tubular stenosis of the mid to distal vessel with TRISTIN-3 grade flow.  The patient subsequently underwent PCI to the RCA on 7/7/2023.  Clinically she is doing well without any concerning anginal type chest discomfort.  Patient will return in 6 weeks at which time an echocardiogram will be done.  2.  Carotid vascular disease, status post right carotid endarterectomy 2005 left carotid endarterectomy 2021.  3.  Peripheral vascular disease/renal artery stenosis.  The patient has evidently had lower extremity stent procedures performed in the past.  The unknown at this time the patient does have an atrophic right kidney.  She underwent PTA of the left renal artery in the past.  The right renal artery exhibits 100% occlusion.  This is located at site of previous stenting.  The patient had a standard renal ultrasound on 3/25/2024 showing severe right renal atrophy and multiple simple left renal cysts.  The patient will have a repeat renal artery ultrasound performed as well as a screening carotid  ultrasound and PVR studies.  4.  Hypertension.  Blood pressure is in acceptable range.  Will modify therapy by discontinuing clonidine and beginning amlodipine 10 mg daily.  Consider switching the patient from atenolol to metoprolol in the future.  5.  Hyperlipidemia.  Patient is currently on rosuvastatin 5 mg daily.  Given her peripheral vascular disease and coronary artery disease will increase rosuvastatin from 5 to 40 mg daily.  6.  Chronic kidney disease.  Lab work from 5/11/2024 includes a normal CBC creatinine 1.50 proBNP 381.  7.?  CHF.  Patient recently seen [facility] 5/11/2024 with shortness of breath.  CBC normal creatinine for obesity 1 chest x-ray was clear.  CT angiogram was negative for pulmonary embolization with dilated main pulmonary arteries.  Cholelithiasis was also noted incidentally.  8.  History of chronic smoking.          Orders:  Orders Placed This Encounter   Procedures    Transthoracic echo (TTE) complete      Followup Appts:  Future Appointments   Date Time Provider Department Center   6/25/2024  9:00 AM GEA [id number] VASCULAR [id number] [facility] [place]   6/25/2024 10:00 AM GEA [id number] VASCULAR [id number] [facility] [place]   6/25/2024 11:00 AM CONC STRESS/ECHO LAB [id number] [facility] [place]   6/25/2024 11:00 AM GEA [id number] VASCULAR [id number] [facility] [place]   6/27/2024 11:00 AM RENÉE Tejeda-CNP [id number] [facility]   7/8/2024  1:00 PM Katheryn Gallegos MD PhD [id number] [facility]           ____________________________________________________________  Anthony Guzman MD  [facility]  Assistant Clinical Professor, [employer]  [facility]

## 2024-06-03 ENCOUNTER — HOSPITAL ENCOUNTER (OUTPATIENT)
Dept: RADIOLOGY | Facility: EXTERNAL LOCATION | Age: 74
Discharge: HOME | End: 2024-06-03
Payer: MEDICARE

## 2024-06-24 ENCOUNTER — APPOINTMENT (OUTPATIENT)
Dept: CARDIOLOGY | Facility: CLINIC | Age: 74
End: 2024-06-24
Payer: MEDICARE

## 2024-06-25 ENCOUNTER — HOSPITAL ENCOUNTER (OUTPATIENT)
Dept: VASCULAR MEDICINE | Facility: CLINIC | Age: 74
Discharge: HOME | End: 2024-06-25
Payer: MEDICARE

## 2024-06-25 ENCOUNTER — HOSPITAL ENCOUNTER (OUTPATIENT)
Dept: CARDIOLOGY | Facility: CLINIC | Age: 74
Discharge: HOME | End: 2024-06-25
Payer: MEDICARE

## 2024-06-25 DIAGNOSIS — I10 ESSENTIAL (PRIMARY) HYPERTENSION: ICD-10-CM

## 2024-06-25 DIAGNOSIS — I25.118 CORONARY ARTERY DISEASE OF NATIVE ARTERY OF NATIVE HEART WITH STABLE ANGINA PECTORIS (CMS-HCC): ICD-10-CM

## 2024-06-25 DIAGNOSIS — R09.89 BILATERAL CAROTID BRUITS: ICD-10-CM

## 2024-06-25 DIAGNOSIS — I28.8 DILATION OF PULMONARY ARTERY (MULTI): ICD-10-CM

## 2024-06-25 DIAGNOSIS — I28.1 ANEURYSM OF PULMONARY ARTERY (MULTI): ICD-10-CM

## 2024-06-25 DIAGNOSIS — I70.1 RENAL ARTERY STENOSIS (CMS-HCC): ICD-10-CM

## 2024-06-25 DIAGNOSIS — I73.9 PERIPHERAL VASCULAR DISEASE (CMS-HCC): ICD-10-CM

## 2024-06-25 PROCEDURE — 93922 UPR/L XTREMITY ART 2 LEVELS: CPT

## 2024-06-25 PROCEDURE — 93880 EXTRACRANIAL BILAT STUDY: CPT

## 2024-06-25 PROCEDURE — 93306 TTE W/DOPPLER COMPLETE: CPT | Performed by: INTERNAL MEDICINE

## 2024-06-25 PROCEDURE — 93306 TTE W/DOPPLER COMPLETE: CPT

## 2024-06-25 PROCEDURE — 93975 VASCULAR STUDY: CPT

## 2024-06-26 LAB
AORTIC VALVE PEAK VELOCITY: 1.5 M/S
AV PEAK GRADIENT: 9 MMHG
AVA (PEAK VEL): 2.44 CM2
EJECTION FRACTION APICAL 4 CHAMBER: 62.5
EJECTION FRACTION: 63 %
LEFT ATRIUM VOLUME AREA LENGTH INDEX BSA: 38.3 ML/M2
LEFT VENTRICLE INTERNAL DIMENSION DIASTOLE: 4.4 CM (ref 3.5–6)
LEFT VENTRICULAR OUTFLOW TRACT DIAMETER: 2.2 CM
MITRAL VALVE E/A RATIO: 0.82
MITRAL VALVE E/E' RATIO: 8.3
RIGHT VENTRICLE FREE WALL PEAK S': 13.6 CM/S
RIGHT VENTRICLE PEAK SYSTOLIC PRESSURE: 47.6 MMHG
TRICUSPID ANNULAR PLANE SYSTOLIC EXCURSION: 2.6 CM

## 2024-06-27 ENCOUNTER — OFFICE VISIT (OUTPATIENT)
Dept: CARDIOLOGY | Facility: CLINIC | Age: 74
End: 2024-06-27
Payer: MEDICARE

## 2024-06-27 VITALS
BODY MASS INDEX: 30.04 KG/M2 | HEART RATE: 67 BPM | RESPIRATION RATE: 16 BRPM | SYSTOLIC BLOOD PRESSURE: 110 MMHG | WEIGHT: 191.4 LBS | HEIGHT: 67 IN | DIASTOLIC BLOOD PRESSURE: 60 MMHG | OXYGEN SATURATION: 96 %

## 2024-06-27 DIAGNOSIS — R42 DIZZINESS: Primary | ICD-10-CM

## 2024-06-27 PROCEDURE — 99214 OFFICE O/P EST MOD 30 MIN: CPT | Performed by: NURSE PRACTITIONER

## 2024-06-27 PROCEDURE — 4004F PT TOBACCO SCREEN RCVD TLK: CPT | Performed by: NURSE PRACTITIONER

## 2024-06-27 PROCEDURE — 1160F RVW MEDS BY RX/DR IN RCRD: CPT | Performed by: NURSE PRACTITIONER

## 2024-06-27 PROCEDURE — 3008F BODY MASS INDEX DOCD: CPT | Performed by: NURSE PRACTITIONER

## 2024-06-27 PROCEDURE — 1159F MED LIST DOCD IN RCRD: CPT | Performed by: NURSE PRACTITIONER

## 2024-06-27 ASSESSMENT — ENCOUNTER SYMPTOMS
RESPIRATORY NEGATIVE: 1
DIZZINESS: 1
DEPRESSION: 0
CONSTITUTIONAL NEGATIVE: 1
MUSCULOSKELETAL NEGATIVE: 1
GASTROINTESTINAL NEGATIVE: 1
CARDIOVASCULAR NEGATIVE: 1

## 2024-06-27 ASSESSMENT — PATIENT HEALTH QUESTIONNAIRE - PHQ9
2. FEELING DOWN, DEPRESSED OR HOPELESS: NOT AT ALL
1. LITTLE INTEREST OR PLEASURE IN DOING THINGS: NOT AT ALL
SUM OF ALL RESPONSES TO PHQ9 QUESTIONS 1 AND 2: 0

## 2024-06-27 NOTE — PROGRESS NOTES
"Chief Complaint:   Follow-up and Results (ECHO and vascular testing completed 6/25/24)    History Of Present Illness:    .Ms Macdonald returns in follow up.  Denies chest pain, sob, palpitations or pedal edema. She states she has episodes of elevated bp and feels dizzy.  Takes an extra clonidine and nitroglycerin with relief.  She will be referred to Dr Duckworth.  She will wear a monitor for two weeks and return in six weeks.        Last Recorded Vitals:  Blood pressure 110/60, pulse 67, resp. rate 16, height 1.702 m (5' 7\"), weight 86.8 kg (191 lb 6.4 oz), SpO2 96%.     Past Medical History:  History reviewed. No pertinent past medical history.     Past Surgical History:  History reviewed. No pertinent surgical history.    Social History:  Social History     Socioeconomic History    Marital status:      Spouse name: None    Number of children: None    Years of education: None    Highest education level: None   Occupational History    None   Tobacco Use    Smoking status: Every Day     Current packs/day: 0.50     Average packs/day: 0.5 packs/day for 50.1 years (25.1 ttl pk-yrs)     Types: Cigarettes     Start date: 5/6/1974    Smokeless tobacco: Never   Substance and Sexual Activity    Alcohol use: Yes     Comment: occassionally    Drug use: Never    Sexual activity: None   Other Topics Concern    None   Social History Narrative    None     Social Determinants of Health     Financial Resource Strain: Not on file   Food Insecurity: Not on file   Transportation Needs: Not on file   Physical Activity: Not on file   Stress: Not on file   Social Connections: Not on file   Intimate Partner Violence: Not on file   Housing Stability: Not on file       Family History:  Family History   Problem Relation Name Age of Onset    Breast cancer Sister  45    Breast cancer Daughter  47    Breast cancer Maternal Grandmother  81         Allergies:  Ticagrelor, Bactrim [sulfamethoxazole-trimethoprim], and Effexor " [venlafaxine]    Outpatient Medications:  Current Outpatient Medications   Medication Sig Dispense Refill    albuterol (Ventolin HFA) 90 mcg/actuation inhaler Inhale 2 puffs every 6 hours by inhalation route as needed.      ALPRAZolam (Niravam) 1 mg disintegrating tablet Take 1 tablet (1 mg) by mouth 2 times a day.      amLODIPine (Norvasc) 10 mg tablet Take 1 tablet (10 mg) by mouth once daily. 30 tablet 11    aspirin 81 mg EC tablet once every 24 hours.      atenolol (Tenormin) 50 mg tablet Take 1 tablet (50 mg) by mouth once daily.      cloNIDine (Catapres) 0.1 mg tablet TAKE 1 TABLET BY MOUTH THREE TIMES A  tablet 3    clopidogrel (Plavix) 75 mg tablet Take 1 tablet (75 mg) by mouth once daily.      fluticasone-umeclidin-vilanter (Trelegy Ellipta) 100-62.5-25 mcg blister with device Inhale 1 puff once daily.      furosemide (Lasix) 40 mg tablet Take 1 tablet (40 mg) by mouth if needed.      rosuvastatin (Crestor) 40 mg tablet Take 1 tablet (40 mg) by mouth once daily. 30 tablet 11    traMADol (Ultram) 50 mg tablet Take 1 tablet (50 mg) by mouth 2 times a day as needed.      triamcinolone (Kenalog) 0.1 % cream APPLY TO RASH/PSORIASIS PATCHES TWICE DAILY FOR 2 WEEKS THEN AS NEEDED FOR FLARE UPS       No current facility-administered medications for this visit.        Physical Exam:  Cardiovascular:      PMI at left midclavicular line. Normal rate. Regular rhythm. Normal S1. Normal S2.       Murmurs: There is no murmur.      No gallop.  No click. No rub.   Pulses:     Intact distal pulses.   Edema:     Peripheral edema absent.         ROS:  Review of Systems   Constitutional: Negative.   Cardiovascular: Negative.    Respiratory: Negative.     Skin: Negative.    Musculoskeletal: Negative.    Gastrointestinal: Negative.    Genitourinary: Negative.    Neurological:  Positive for dizziness.          Last Labs:  CBC -  Lab Results   Component Value Date    WBC 6.8 05/11/2024    HGB 14.1 05/11/2024    HCT 44.7  "05/11/2024    MCV 94 05/11/2024     05/11/2024       CMP -  Lab Results   Component Value Date    CALCIUM 9.8 05/11/2024    PHOS 3.8 03/25/2024    PROT 8.0 (H) 05/11/2024    ALBUMIN 4.3 05/11/2024    AST 21 05/11/2024    ALT 11 05/11/2024    ALKPHOS 75 05/11/2024    BILITOT 0.3 05/11/2024       LIPID PANEL -   Lab Results   Component Value Date    CHOL 126 (L) 05/18/2023    TRIG 76 05/18/2023    HDL 52 05/18/2023    CHHDL 2.4 05/18/2023       RENAL FUNCTION PANEL -   Lab Results   Component Value Date    GLUCOSE 131 (H) 05/11/2024     05/11/2024    K 3.5 05/11/2024    CL 93 (L) 05/11/2024    CO2 24 05/11/2024    ANIONGAP 16 05/11/2024    BUN 21 05/11/2024    CREATININE 1.50 05/11/2024    CALCIUM 9.8 05/11/2024    PHOS 3.8 03/25/2024    ALBUMIN 4.3 05/11/2024        No results found for: \"BNP\", \"HGBA1C\"      Assessment/Plan   Problem List Items Addressed This Visit    None    1.  Coronary artery disease status post PCI to the RCA 7/7/2023 [facility]..  The patient is an early elderly white female who moved from [place] approximately 2 years ago.  She does have coronary artery disease with a CT coronary calcium score of 2960.  The patient underwent cardiac catheterization on 6/22/2023.  The LMCA was moderately calcified with a 40% distal taper just before bifurcation.  The LAD was a tortuous vessel heavily calcified in the proximal portion with a 20% ostial narrowing with otherwise no significant disease.  The diagonal branch had 2 subbranches the more inferior of which had a 90% focal mid vessel stenosis for which medical management was recommended.  The nondominant LCx had a 100% late mid to distal occlusion but with left to left collaterals not amenable to intervention.  The ramus intermedius was relatively small no significant disease.  The RCA was very tortuous heavily calcified in the proximal and midportion and had a severe 80% tubular stenosis of the mid to distal vessel with TRISTIN-3 grade " flow.  The patient subsequently underwent PCI to the RCA on 7/7/2023.  Clinically she is doing well without any concerning anginal type chest discomfort.   Echo 06/2024  CONCLUSIONS:   1. The left ventricular systolic function is normal, with a visually estimated ejection fraction of 60-65%.   2. Spectral Doppler shows an impaired relaxation pattern of left ventricular diastolic filling.   3. There is moderate concentric left ventricular hypertrophy.   4. Trace to mild mitral valve regurgitation.   5. Mild to moderately elevated right ventricular systolic pressure.   6. Trace to mild tricuspid regurgitation visualized.   7. Mild to moderately elevated pulmonary artery pressure.   8. The estimated PASP is 48 mmHg.   2.  Carotid vascular disease, status post right carotid endarterectomy 2005 left carotid endarterectomy 2021.  3.  Peripheral vascular disease/renal artery stenosis.  The patient has evidently had lower extremity stent procedures performed in the past.  The unknown at this time the patient does have an atrophic right kidney.  She underwent PTA of the left renal artery in the past.  The right renal artery exhibits 100% occlusion.  This is located at site of previous stenting.  The patient had a standard renal ultrasound on 3/25/2024 showing severe right renal atrophy and multiple simple left renal cysts.  The patient will have a repeat renal artery ultrasound performed as well as a screening carotid ultrasound and PVR studies which were done 06/25/2024 and she will be referred to Dr Duckworth.   4.  Hypertension.  Blood pressure is in acceptable range.  Will modify therapy by discontinuing clonidine and beginning amlodipine 10 mg daily.  Consider switching the patient from atenolol to metoprolol in the future.  5.  Hyperlipidemia.  Patient is currently on rosuvastatin 5 mg daily.  Given her peripheral vascular disease and coronary artery disease will increase rosuvastatin from 5 to 40 mg daily.  6.  Chronic  kidney disease.  Lab work from 5/11/2024 includes a normal CBC creatinine 1.50 proBNP 381.  7.?  CHF.  Patient recently seen [facility]/11/2024 with shortness of breath.  CBC normal creatinine for obesity 1 chest x-ray was clear.  CT angiogram was negative for pulmonary embolization with dilated main pulmonary arteries.  Cholelithiasis was also noted incidentally.  8.  History of chronic smoking.   9.  Dizziness.  Wear monitor for two weeks, return in six weeks.       Jenna Guevara, APRN-CNP

## 2024-07-03 ENCOUNTER — APPOINTMENT (OUTPATIENT)
Dept: CARDIOLOGY | Facility: CLINIC | Age: 74
End: 2024-07-03
Payer: MEDICARE

## 2024-07-03 ENCOUNTER — HOSPITAL ENCOUNTER (OUTPATIENT)
Dept: CARDIOLOGY | Facility: CLINIC | Age: 74
Discharge: HOME | End: 2024-07-03
Payer: MEDICARE

## 2024-07-03 DIAGNOSIS — R42 DIZZINESS: ICD-10-CM

## 2024-07-03 PROCEDURE — 93246 EXT ECG>7D<15D RECORDING: CPT

## 2024-07-05 ENCOUNTER — TELEPHONE (OUTPATIENT)
Dept: CARDIOLOGY | Facility: CLINIC | Age: 74
End: 2024-07-05
Payer: MEDICARE

## 2024-07-05 NOTE — PROGRESS NOTES
Subjective   Soledad Macdonald is a 73 y.o. female who presents for FOLLOW UP VISIT TO DISCUSS AND REVIEW TEST RESULTS; patient did not complete labwork ordered 5/6/24.    HPI:      73 y.o. female CURRENT SMOKER (1/2 ppd x 1 years + 2 ppd x 50 years= 101 pack-years)  who presents for FOLLOW UP VISIT TO DISCUSS AND REVIEW TEST RESULTS; patient did not complete labwork ordered 5/6/24.     EMR/EPIC records reviewed.     Last seen by me on 5/6/24 for  NPV TO ESTABLISH PCP CARE and FOR CARE GAP REVIEW.  At visit:    Establish PCP care  -labs ordered (see A/P above for details)     HTN: followed by cardiology  -CMP, UA ordered  -cont BP medications and management per cardiology Dr. Thakkar  -low salt, low cholesterol, low fat diet, regularly exercise, and limit alcohol intake     Pulmonary nodules on CT Lung cancer screening 4/11/24  -referral to pulmonology ordered for evaluation and management     CKD Stage 3 B and severe right renal atrophy: followed by nephrology  -CMP, UA ordered  -cont management per nephrology  -patient counseled to AVOID NSAIDS to preserve renal function  -low salt, low cholesterol, low fat diet, regularly exercise, and limit alcohol intake     Arthrosclerosis/CAD; extremely elevated coronary calcium score : Total Agatston score 2960 (CT Heart Ca scoring 3/17/23); followed by cardiology  -cont management and medications per cardiology  -Emergency Dept and 911/EMS cardiac precautions discussed and reviewed with patient     Severe atherosclerotic calcification of the thoracic aorta with areas of mild aneurysmal dilatation in the arch and descending thoracic aorta on CT Lung cancer screening 4/11/24  -referral to cardiovascular surgery ordered  -Emergency Dept and 911/EMS cardiac precautions discussed and reviewed with patient     Dilated main pulmonary artery which can be seen in pulmonary artery hypertension on CT Lung cancer screening 4/11/24  -referral to cardiology and pulmonology ordered;  patient advised to discuss with her cardiologist      Eczema:  -referral to dermatology ordered  -cont triamcinolone as needed     Lipid Disorder screening  -lipid panel ordered     Diabetes Screening  -HgBA1c ordered     Vitamin D deficiency  -Vit D levels ordered     Hep C screening  -Hep C antibody ordered     STI Screening:  -HIV, syphilis, GC/CT, trich ordered     Breast Cancer Screening: MAMMOGRAM NOW DUE   -mammogram ordered      Colon Cancer Screening: NOW DUE   -colonoscopy ordered      Age related osteoporosis:   -DEXA ordered         Immunizations Counseling  -pneumonia, shingles, and TDAP now due==> PREVNAR 20 RECEIVED TODAY   -recommend updated COVID spike vaccine that can be obtained at local pharmacy     FOLLOW-UP: 4 weeks to discuss and review test results and 8 weeks for PHYSICAL        PMHx:  HTN; followed by cardiology  CKD Stage 3 B; GFR 40 (3/25/24)  Arthrosclerosis/CAD; extremely elevated coronary calcium score Total Agatston score 2960 (CT Heart Ca scoring 3/17/23); followed by cardiology  Pulmonary nodules up to 5 mm on CT Lung cancer screening 4/11/24  Severe atherosclerotic calcification of the thoracic aorta with areas of mild aneurysmal dilatation in the arch and descending thoracic aorta on CT Lung cancer screening 4/11/24  Dilated main pulmonary artery which can be seen in pulmonary artery hypertension on CT Lung cancer screening 4/11/24  Severe Right renal atrophy     Renal US 3/25/24:  IMPRESSION:  Severe right renal atrophy.  Multiple simple left renal cysts. No renal calculi or hydronephrosis.     CT Lung Cancer Screening 4/11/24:  IMPRESSION:  1. Couple of nodules in the left lower lobe measuring up to 5 mm as  described above. Continued screening with low-dose noncontrast chest  CT in 12 months (from current date) is recommended.  2. Mild upper lung predominant emphysema.  3. Severe atherosclerotic calcification of the thoracic aorta with  areas of mild aneurysmal dilatation in  "the arch and descending  thoracic aorta. Recommend dedicated CT angiography of the thoracic  aorta.  4. Dilated main pulmonary artery which can be seen in pulmonary  artery hypertension.  5. Severe coronary artery calcification. Estimated coronary artery  calcium score 957. Correlate with coronary artery disease risk  factors.  6. Cholelithiasis.           Healthcare Providers:  Cardiology (primary): Dr. Nunez  Nephrology: Dr. Canchola  Vascular surgery: Dr. Gibbons  Cardiology: Dr. Thakkar  Pulmonology: Dr. Neil   Prior PCP:  Dr Mims        Preventive Health Services:  -Last physical:  -last mammogram: ? NOW DUE  -last colonoscopy: ?  -last STI screening: ?  -Hep C screening: ?  -DEXA:  NOW DUE     CT Lung Cancer Screening 4/11/24:  IMPRESSION:  1. Couple of nodules in the left lower lobe measuring up to 5 mm as  described above. Continued screening with low-dose noncontrast chest  CT in 12 months (from current date) is recommended.  2. Mild upper lung predominant emphysema.  3. Severe atherosclerotic calcification of the thoracic aorta with  areas of mild aneurysmal dilatation in the arch and descending  thoracic aorta. Recommend dedicated CT angiography of the thoracic  aorta.  4. Dilated main pulmonary artery which can be seen in pulmonary  artery hypertension.  5. Severe coronary artery calcification. Estimated coronary artery  calcium score 957. Correlate with coronary artery disease risk  factors.  6. Cholelithiasis.     Ct Heart Ca scoring 3/17/23:  FINDINGS:  The Agatston Score as reported in this table provides a measure for  comparison with published studies (see \"Percentile Ranking\" below). The  Volume Score can be useful for comparison with follow-up examinations, and  is reported here:  2404 mm.        CORONARY ARTERY  SCORE  -----------------------------------------------------  Left Main (LM) 241  Left Anterior Descending (LAD) 597  Left Circumflex (LCX) 225  Right Coronary Artery (RCA) 1896  Total " Agatston score 2960                 Immunizations:   -Childhood vaccines: completed per patient    -COVID vaccine and booster:  -updated COVID spike vaccine: NOW DUE  -Flu vaccine: NOW DUE  -TDAP:  ? NOW DUE  -RSV: NOW DUE     Immunization History   Administered Date(s) Administered    Pneumococcal conjugate vaccine, 20-valent (PREVNAR 20) 05/06/2024       INTERVAL HISTORY:  -completed mammogram and DEXA scan    -mammogram 5/17/24:  A well-circumscribed benign mass is again seen in the central to  medial left breast at anterior depth. No suspicious masses or  calcifications are identified in either breast.      IMPRESSION:  No mammographic evidence of malignancy.      DEXA Scan (5/17/24):  IMPRESSION:  DEXA:  According to World Health Organization criteria,  classification is normal.    -saw cardiology MEGHAN Tejeda  6/27/24 for follow up on ECHO and vascular testing.  Per Provider note:  '1.  Coronary artery disease status post PCI to the RCA 7/7/2023 [facility]..  The patient is an early elderly white female who moved from [place] approximately 2 years ago.  She does have coronary artery disease with a CT coronary calcium score of 2960.  The patient underwent cardiac catheterization on 6/22/2023.  The LMCA was moderately calcified with a 40% distal taper just before bifurcation.  The LAD was a tortuous vessel heavily calcified in the proximal portion with a 20% ostial narrowing with otherwise no significant disease.  The diagonal branch had 2 subbranches the more inferior of which had a 90% focal mid vessel stenosis for which medical management was recommended.  The nondominant LCx had a 100% late mid to distal occlusion but with left to left collaterals not amenable to intervention.  The ramus intermedius was relatively small no significant disease.  The RCA was very tortuous heavily calcified in the proximal and midportion and had a severe 80% tubular stenosis of the mid to distal vessel with  TRISTIN-3 grade flow.  The patient subsequently underwent PCI to the RCA on 7/7/2023.  Clinically she is doing well without any concerning anginal type chest discomfort.   Echo 06/2024  CONCLUSIONS:   1. The left ventricular systolic function is normal, with a visually estimated ejection fraction of 60-65%.   2. Spectral Doppler shows an impaired relaxation pattern of left ventricular diastolic filling.   3. There is moderate concentric left ventricular hypertrophy.   4. Trace to mild mitral valve regurgitation.   5. Mild to moderately elevated right ventricular systolic pressure.   6. Trace to mild tricuspid regurgitation visualized.   7. Mild to moderately elevated pulmonary artery pressure.   8. The estimated PASP is 48 mmHg.   2.  Carotid vascular disease, status post right carotid endarterectomy 2005 left carotid endarterectomy 2021.  3.  Peripheral vascular disease/renal artery stenosis.  The patient has evidently had lower extremity stent procedures performed in the past.  The unknown at this time the patient does have an atrophic right kidney.  She underwent PTA of the left renal artery in the past.  The right renal artery exhibits 100% occlusion.  This is located at site of previous stenting.  The patient had a standard renal ultrasound on 3/25/2024 showing severe right renal atrophy and multiple simple left renal cysts.  The patient will have a repeat renal artery ultrasound performed as well as a screening carotid ultrasound and PVR studies which were done 06/25/2024 and she will be referred to Dr Duckworth.   4.  Hypertension.  Blood pressure is in acceptable range.  Will modify therapy by discontinuing clonidine and beginning amlodipine 10 mg daily.  Consider switching the patient from atenolol to metoprolol in the future.  5.  Hyperlipidemia.  Patient is currently on rosuvastatin 5 mg daily.  Given her peripheral vascular disease and coronary artery disease will increase rosuvastatin from 5 to 40 mg daily.  6.  " Chronic kidney disease.  Lab work from 5/11/2024 includes a normal CBC creatinine 1.50 proBNP 381.  7.?  CHF.  Patient recently seen [facility] 5/11/2024 with shortness of breath.  CBC normal creatinine for obesity 1 chest x-ray was clear.  CT angiogram was negative for pulmonary embolization with dilated main pulmonary arteries.  Cholelithiasis was also noted incidentally.  8.  History of chronic smoking.   9.  Dizziness.  Wear monitor for two weeks, return in six weeks. \"    -saw cardiology Dr. Guzman 5/23/24 to establish cardiology care and evaluation of dilated pulmonary artery.  Per Provider note:  \"Assessment/Plan   1.  Coronary artery disease status post PCI to the RCA 7/7/2023 [facility]..  The patient is an early elderly white female who moved from [place] approximately 2 years ago.  She does have coronary artery disease with a CT coronary calcium score of 2960.  The patient underwent cardiac catheterization on 6/22/2023.  The LMCA was moderately calcified with a 40% distal taper just before bifurcation.  The LAD was a tortuous vessel heavily calcified in the proximal portion with a 20% ostial narrowing with otherwise no significant disease.  The diagonal branch had 2 subbranches the more inferior of which had a 90% focal mid vessel stenosis for which medical management was recommended.  The nondominant LCx had a 100% late mid to distal occlusion but with left to left collaterals not amenable to intervention.  The ramus intermedius was relatively small no significant disease.  The RCA was very tortuous heavily calcified in the proximal and midportion and had a severe 80% tubular stenosis of the mid to distal vessel with TRISTIN-3 grade flow.  The patient subsequently underwent PCI to the RCA on 7/7/2023.  Clinically she is doing well without any concerning anginal type chest discomfort.  Patient will return in 6 weeks at which time an echocardiogram will be done.  2.  Carotid vascular disease, " "status post right carotid endarterectomy 2005 left carotid endarterectomy 2021.  3.  Peripheral vascular disease/renal artery stenosis.  The patient has evidently had lower extremity stent procedures performed in the past.  The unknown at this time the patient does have an atrophic right kidney.  She underwent PTA of the left renal artery in the past.  The right renal artery exhibits 100% occlusion.  This is located at site of previous stenting.  The patient had a standard renal ultrasound on 3/25/2024 showing severe right renal atrophy and multiple simple left renal cysts.  The patient will have a repeat renal artery ultrasound performed as well as a screening carotid ultrasound and PVR studies.  4.  Hypertension.  Blood pressure is in acceptable range.  Will modify therapy by discontinuing clonidine and beginning amlodipine 10 mg daily.  Consider switching the patient from atenolol to metoprolol in the future.  5.  Hyperlipidemia.  Patient is currently on rosuvastatin 5 mg daily.  Given her peripheral vascular disease and coronary artery disease will increase rosuvastatin from 5 to 40 mg daily.  6.  Chronic kidney disease.  Lab work from 5/11/2024 includes a normal CBC creatinine 1.50 proBNP 381.  7.?  CHF.  Patient recently seen [facility] 5/11/2024 with shortness of breath.  CBC normal creatinine for obesity 1 chest x-ray was clear.  CT angiogram was negative for pulmonary embolization with dilated main pulmonary arteries.  Cholelithiasis was also noted incidentally.  8.  History of chronic smoking.\"       -seen in ED 5/11/24 for chest pain, CT PE negative for PE, and discharged home. Per ED Provider note:  \"HPI      Patient is a 73-year-old female with a history of hypertension and hyperlipidemia presenting for evaluation of substernal chest pain.  Patient states the pain has been on and off all day.  She admits to associated shortness of breath but states that she is always short of breath because she has " emphysema.  He is a current tobacco user.  Nothing makes the pain better or worse.  She has not taken anything for the pain.  Patient denies associated abdominal pain, nausea, vomiting, diarrhea or constipation.  No fevers or chills.  No known sick contacts.  No history of PE or DVT.  No history of diabetes.  Medical Decision Making  Parts of this chart have been completed using voice recognition software. Please excuse any errors of transcription. Despite the medical decision making time stamp above-my medical decision making has taken place during the patient's entire visit. My thought process and reason for plan has been formulated from the time that I saw the patient until the time of disposition and is not specific to one specific moment during their visit and furthermore my MDM encompasses this entire chart and not only this text box.        HPI: Detailed above.     Exam: A medically appropriate exam performed, outlined above, given the known history and presentation.     History obtained from: Patient     Social Determinants of Health considered during this visit: Coming from home     EKG interpreted by my attending physician, reviewed by myself.           Labs Reviewed   N-TERMINAL PROBNP - Abnormal       Result Value      PROBNP 381 (*)       Narrative:      Reference ranges are based on clinical submission data. These ranges represent the 95th percentile of normal cut-off points. As NT Pro- BNP values approach 1000 pg/ml, clinical symptoms are more likely associated with CHF.   CBC WITH AUTO DIFFERENTIAL - Abnormal     WBC 6.8        nRBC 0.0        RBC 4.75        Hemoglobin 14.1        Hematocrit 44.7        MCV 94        MCH 29.7        MCHC 31.5 (*)       RDW 13.5        Platelets 213        Neutrophils % 55.0        Immature Granulocytes %, Automated 0.1        Lymphocytes % 32.3        Monocytes % 7.3        Eosinophils % 4.7        Basophils % 0.6        Neutrophils Absolute 3.71        Immature  Granulocytes Absolute, Automated 0.01        Lymphocytes Absolute 2.18        Monocytes Absolute 0.49        Eosinophils Absolute 0.32        Basophils Absolute 0.04      COMPREHENSIVE METABOLIC PANEL - Abnormal     Glucose 131 (*)       Sodium 133        Potassium 3.5        Chloride 93 (*)       Bicarbonate 24        Urea Nitrogen 21        Creatinine 1.50        eGFR 37 (*)       Calcium 9.8        Albumin 4.3        Alkaline Phosphatase 75        Total Protein 8.0 (*)       AST 21        Bilirubin, Total 0.3        ALT 11        Anion Gap 16      SERIAL TROPONIN, INITIAL ([facility]) - Abnormal     Troponin T, High Sensitivity 20 (*)     TROPONIN T SERIES, HIGH SENSITIVITY (0, 2 HR, 6 HR)     Narrative:      The following orders were created for panel order Troponin T Series, High Sensitivity (0, 2HR, 6HR).  Procedure                               Abnormality         Status                     ---------                               -----------         ------                     Serial Troponin, Initial...[[id number]]  Abnormal            Final result               Serial Troponin, 2 Hour ...[[id number]]                                                    Please view results for these tests on the individual orders.   SERIAL TROPONIN,  2 HOUR ([facility])      CT angio chest for pulmonary embolism   Final Result   1. No acute pulmonary embolism.   2. Web-like filling defect within a posterior right lower lobe   branching point concerning for chronic thromboembolic disease.   Dilated main pulmonary artery suggesting chronic thromboembolic   pulmonary hypertension.   3. 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   Continued surveillance is suggested.   4. Unchanged pulmonary nodules. 12 month follow-up screening chest CT   is recommended.   5. Cholelithiasis.   6. Partially visualized right kidney demonstrates severe atrophy.        Signed by: Sagar Tom 5/11/2024 10:20 PM   Dictation workstation:    [id number]       XR chest 1 view   Final Result   No airspace consolidation or pleural effusion.        MACRO:   None        Signed by: Yosef Rivas 5/11/2024 9:26 PM   Dictation workstation:   [id number]          Medications   aspirin tablet 325 mg (325 mg oral Not Given 5/11/24 2015)   famotidine PF (Pepcid) injection 20 mg (20 mg intravenous Given 5/11/24 2034)   sodium chloride 0.9 % bolus 500 mL (0 mL intravenous Stopped 5/11/24 2135)   ondansetron (Zofran) injection 4 mg (4 mg intravenous Given 5/11/24 2035)   morphine injection 4 mg (4 mg intravenous Given 5/11/24 2034)   ipratropium-albuteroL (Duo-Neb) 0.5-2.5 mg/3 mL nebulizer solution 3 mL (3 mL nebulization Given 5/11/24 2035)   iohexol (OMNIPaque) 350 mg iodine/mL solution 75 mL (75 mL intravenous Given 5/11/24 2119)      Differential diagnoses considered for this visit include: Acute coronary syndrome versus STEMI versus NSTEMI versus pulmonary embolism     Considerations/further MDM:     Patient is a 73-year-old female with a history of hypertension, hypercholesterolemia and CAD with stent placement presenting for evaluation of chest pain.  CBC and CMP were within normal limits.  proBNP of 381.  Initial troponin of 20. Chest x-ray showed no airspace consolidation or pleural effusion.  CT angio chest for pulmonary embolism was negative for acute pulmonary embolus.     I discussed the patient's results with her.  Patient did have a heart score of 5, and I recommended admission to the hospital for further cardiac workup and observation.  Patient did not want to stay in the hospital as she stated that there was something that anyone could do for her over the weekend.  She is choosing to leave AGAINST MEDICAL ADVICE.  We discussed the nature and purpose, risks and benefits, as well as, the alternatives of the given diagnosis and concerns. Time was given to allow the opportunity to ask questions and consider their options, and after the discussion, the  patient decided to refuse the offered treatment plan. The patient was informed that refusal could lead to, but was not limited to, death, permanent disability, or severe pain, loss of current lifestyles and abilities. If present, I asked the relatives or significant others to dissuade them without success. Prior to refusing, their nurse and I determined and agreed that the patient had the capacity to make their decision and understood the consequences of that decision. They signed the refusal of treatment form and their nurse signed the form agreeing that the patient/guardian had received informed consent. After refusal, I made every reasonable opportunity to treat them to the best of my ability while still trying to accommodate the patient´s wishes and desires.     Patient was seen in conjunction with attending physician Dr. Viki Beltre.   Patient's history, physical exam, diagnostic studies, and treatment plan were discussed thoroughly.     Procedure  Procedures     Rylee Mishra PA-C  05/11/24 2239                   Attestation signed by Viki Beltre MD at 5/12/2024 10:24 PM     Attestation/Supervisory note for JACEK Mishra        The patient is a 73-year-old female presenting to the emergency department for evaluation of substernal chest pain.  She states that has been fluctuating in intensity all day.  No specific better or worse.  No radiation.  She states that she does have some shortness of breath but that is chronic for her because she has emphysema.  She continues to smoke daily.  She denies any sick contacts or recent travel.  No fever or chills.  No headache or visual changes.  No abdominal pain.  No nausea vomiting.  No diarrhea or constipation but no urinary complaints.  No fever or chills.  No recent travel or immobility.  No recent surgery.  She denies any history of CAD or ACS.  No history of PE or DVT.  No history of diabetes.  She states that she does have a history of hypertension and  hyperlipidemia.  All pertinent positives and negatives are recorded above.  All other systems reviewed and otherwise negative.  Vital signs with tachycardia and hypertension but otherwise within normal limits.  Physical exam with a well-nourished well-developed female in no acute distress.  HEENT exam with dry mucous membranes but otherwise unremarkable.  She has no evidence of airway compromise or respiratory distress.  Abdominal exam is benign.  She has no gross motor, neurologic or vascular deficits on exam.  She does have bilateral lower extremity pitting edema.  Pulses are equal in all 4 extremities.        EKG with sinus tachycardia 130 bpm, normal axis, normal voltage, normal ST segment, normal T waves.  There are occasional PVCs        Oral aspirin, IV fluids, IV Pepcid, IV morphine, IV Zofran and duoneb ordered.        Diagnostic labs with mild electrolyte imbalance and borderline troponin T but otherwise unremarkable.        Initial Troponin T 20. Repeat trop T pending at the time of my departure        Heart score 4        XR chest 1 view   Final Result   No airspace consolidation or pleural effusion.        MACRO:   None        Signed by: Yosef Rivas 5/11/2024 9:26 PM   Dictation workstation:   [id number]       CT angio chest for pulmonary embolism    (Results Pending)         The patient does not have any evidence of ischemia/STEMI on EKG but the troponin is borderline.  Repeat troponin T is pending at the time of my departure.  The patient did not have any events on telemetry other than sinus tachycardia.  Chest x-ray without acute process.  No evidence of pneumonia or pneumothorax.  No evidence of CHF.  CT chest was in process at the time of my departure.        JACEK Mishra will continue manage the patient primarily.  Anticipate disposition based on the results of the CT chest and repeat troponin T.  Patient will need to be admitted for further management and trending of her cardiac enzymes if the  CT chest does not show any indication for transfer.        Impression/diagnosis  Chest pain, substernal  Hypertension, unspecified  Electrolyte imbalance        Critical care time billing is not warranted at this time           I personally saw the patient and made/approve the management plan and take responsibility for the patient management.        I independently interpreted the following study (S): EKG and diagnostic labs        I personally discussed the patient's management with the patient        I reviewed the results of the diagnostic labs and diagnostic imaging.  Formal radiology read was completed by the radiologist.        Viki Beltre MD                 CT Chest for PE (5/11/24):  IMPRESSION:  1. No acute pulmonary embolism.  2. Web-like filling defect within a posterior right lower lobe  branching point concerning for chronic thromboembolic disease.  Dilated main pulmonary artery suggesting chronic thromboembolic  pulmonary hypertension.  3. 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  Continued surveillance is suggested.  4. Unchanged pulmonary nodules. 12 month follow-up screening chest CT  is recommended.  5. Cholelithiasis.  6. Partially visualized right kidney demonstrates severe atrophy.        Today Soledad reports:     - doing and feeling well.     -resolved chest pain since ED visit 5/11/24    -intermittent dizziness rated in severity as 4/10, not worsening over time.  She denies associated chest pain, heart palpitations, SOB, cough, orthopnea, exertional dyspnea, fatigue, or LE swelling She reports that she discussed with cardiology who is aware and she was given a cardiac monitor.      -saw cardiology Dr. Guzman and has scheduled cardiology follow up 7/31/24     -taking all medications as prescribed with no reported adverse medication side effects        Today she has no other reported complaints, issues, or problems.     ROS is NEG for HEADACHE, NAUSEA, VOMITING,  DIARRHEA, CHEST PAIN, SOB, and BLEEDING.  Review of systems (10+) is negative for all systems except for any identified issues in HPI above.          Objective     LMP  (LMP Unknown)      Physical Examination:       GENERAL           General Appearance: well-appearing, well-developed, well-hydrated, well-nourished, no acute distress.        HEENT           NECK supple, no masses or thyromegaly, no carotid bruit.        EYES           Extraocular Movements: normal, bilateral eyes BING, no conjunctival injection.        HEART           Rate and Rhythm regular rate and rhythm. Heart sounds: normal S1S2, no S3 or S4. Murmurs: none.        CHEST           Breath sounds: Clear to IPPA, RR<16 no use of accessory muscles.        ABDOMEN           General: Neg for LKKS or masses, no scleral icterus or jaundice.        MUSCULOSKELETAL           Joints Demonstration: Neg for erythema, swelling or joint deformities. gross abnormalities no gross abnormalities.        EXTREMITIES           Lower Extremities: Neg for cyanosis, clubbing or edema.       Assessment/Plan   Problem List Items Addressed This Visit    None    HTN: well controlled. followed by cardiology  -CMP, UA ordered 5/6/24 ==> NOT YET COMPLETED==> patient advised to go to [facility] or another  lab to complete labwork  -cont BP medications and management per cardiology Dr. Guzman  -low salt, low cholesterol, low fat diet, regularly exercise, and limit alcohol intake    Intermittent Dizziness: no red flag signs/sxs. Followed by cardiology.   -cont management per cardiology  -referral to neurology ordered  -referral to ENT ordered  -Emergency Dept precautions discussed and reviewed with patient    Elevated Total Protein (8.0)  -CMP ordered 5/6/24==> NOT YET COMPLETED==> patient advised to go to [facility] or another  lab to complete labwork  -referral to hematology ordered for evaluation    Pulmonary nodules on CT Lung cancer screening 4/11/24  -referral to  pulmonology ordered 5/6/24 for evaluation and management==> patient advised to call to schedule appt     CKD Stage 3 B and severe right renal atrophy: followed by nephrology. GFR 37 (5/11/24)  -CMP, UA ordered 5/6/24 ==> NOT YET COMPLETED==> patient advised to go to [facility] or another  lab to complete labwork  -cont management per nephrology  -patient counseled to AVOID NSAIDS to preserve renal function  -low salt, low cholesterol, low fat diet, regularly exercise, and limit alcohol intake     Arthrosclerosis/CAD; extremely elevated coronary calcium score : Total Agatston score 2960 (CT Heart Ca scoring 3/17/23) and history of chest pain; followed by cardiology  -cont management and medications per cardiology  -cont crestor 40 mg daily  -Emergency Dept and 911/EMS cardiac precautions discussed and reviewed with patient     Severe atherosclerotic calcification of the thoracic aorta with areas of mild aneurysmal dilatation in the arch and descending thoracic aorta on CT Lung cancer screening 4/11/24 and  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 on CT Chest PE 5/11/24  -referral to cardiovascular surgery ordered 5/6/24==>re-ordered today==> patient advised to call to schedule appt  -Emergency Dept and 911/EMS cardiac precautions discussed and reviewed with patient     Dilated main pulmonary artery which can be seen in pulmonary artery hypertension on CT Lung cancer screening 4/11/24 and on CT PE 5/11/24: Followed by cardiology  -referral to cardiology and pulmonology previously ordered 5/6/24; patient advised to call to schedule to see pulmonology  -cont management per cardiology    Cholelithiasis on CT PE 5/11/24: asymptomatic  -Emergency Dept precautions discussed and reviewed with patient  -at follow up visit will refer to general surgery for evaluation if patient is interested     Eczema:  -referral to dermatology ordered 5/6/24  -cont triamcinolone as needed     Lipid Disorder  screening  -lipid panel ordered 5/6/24==> NOT YET COMPLETED==> patient advised to go to [facility] or another  lab to complete labwork     Diabetes Screening  -HgBA1c ordered 5/6/24==> NOT YET COMPLETED==> patient advised to go to [facility] or another  lab to complete labwork     Vitamin D deficiency  -Vit D levels ordered 5/6/24==> NOT YET COMPLETED==> patient advised to go to [facility] or another  lab to complete labwork     Hep C screening  -Hep C antibody ordered 5/6/24==> NOT YET COMPLETED==> patient advised to go to [facility] or another  lab to complete labwork     STI Screening:  -HIV, syphilis  ordered 5/6/24==> NOT YET COMPLETED==> patient advised to go to [facility] or another  lab to complete labwork     Breast Cancer Screening: MAMMOGRAM COMPLETED 5/17/24==> NEXT DUE 5/17/25     Colon Cancer Screening: NOW DUE   -colonoscopy ordered 5/6/24     Age related osteoporosis: DEXA normal 5/17/24==> NEXT DUE 5/17/26     Counseling:       Medication education:         Education:  The patient is counseled regarding potential side-effects of all new medications        Understanding:  Patient expressed understanding        Adherence:  No barriers to adherence identified        Immunizations Counseling  -TDAP now due==> declined today  -recommend updated COVID spike vaccine, RSV, and shingles vaccine that can be obtained at local pharmacy     FOLLOW-UP: 4 weeks to discuss and review test results and 8 weeks for PHYSICAL/Medicare Wellness Visit     Discussed recommended plan of care with patient. Patient expressed understanding and agreement with plan of care. All of patient's questions were answered at the time. Patient had no additional questions at the time.            Katheryn Gallegos MD, PhD

## 2024-07-08 ENCOUNTER — APPOINTMENT (OUTPATIENT)
Dept: PRIMARY CARE | Facility: CLINIC | Age: 74
End: 2024-07-08
Payer: MEDICARE

## 2024-07-08 DIAGNOSIS — I10 PRIMARY HYPERTENSION: ICD-10-CM

## 2024-07-08 DIAGNOSIS — L30.9 ECZEMA, UNSPECIFIED TYPE: ICD-10-CM

## 2024-07-08 DIAGNOSIS — I71.23 ANEURYSM OF DESCENDING THORACIC AORTA WITHOUT RUPTURE (CMS-HCC): ICD-10-CM

## 2024-07-08 DIAGNOSIS — I25.10 CORONARY ARTERY DISEASE INVOLVING NATIVE HEART, UNSPECIFIED VESSEL OR LESION TYPE, UNSPECIFIED WHETHER ANGINA PRESENT: Primary | ICD-10-CM

## 2024-07-08 DIAGNOSIS — R91.8 PULMONARY NODULES: ICD-10-CM

## 2024-07-08 DIAGNOSIS — I28.8 DILATION OF PULMONARY ARTERY (MULTI): ICD-10-CM

## 2024-07-08 DIAGNOSIS — E55.9 VITAMIN D DEFICIENCY: ICD-10-CM

## 2024-07-08 DIAGNOSIS — N18.32 STAGE 3B CHRONIC KIDNEY DISEASE (MULTI): ICD-10-CM

## 2024-07-08 DIAGNOSIS — Z71.85 IMMUNIZATION COUNSELING: ICD-10-CM

## 2024-07-08 DIAGNOSIS — I73.9 PERIPHERAL VASCULAR DISEASE (CMS-HCC): Primary | ICD-10-CM

## 2024-07-08 DIAGNOSIS — Z12.11 COLON CANCER SCREENING: ICD-10-CM

## 2024-07-08 DIAGNOSIS — R77.8 ELEVATED TOTAL PROTEIN: ICD-10-CM

## 2024-07-08 DIAGNOSIS — R42 DIZZINESS: ICD-10-CM

## 2024-07-30 PROBLEM — I25.112 ATHEROSCLEROTIC HEART DISEASE OF NATIVE CORONARY ARTERY WITH REFRACTORY ANGINA PECTORIS (CMS-HCC): Status: ACTIVE | Noted: 2023-06-22

## 2024-07-30 PROBLEM — N18.30 STAGE 3 CHRONIC KIDNEY DISEASE (MULTI): Status: ACTIVE | Noted: 2020-09-11

## 2024-07-30 PROBLEM — I70.1 RENAL ARTERY STENOSIS (CMS-HCC): Status: ACTIVE | Noted: 2020-09-14

## 2024-07-30 PROBLEM — R07.9 CHEST PAIN: Status: ACTIVE | Noted: 2024-07-30

## 2024-07-30 PROBLEM — R06.00 DYSPNEA: Status: ACTIVE | Noted: 2024-07-30

## 2024-07-30 PROBLEM — F17.200 SMOKER: Status: ACTIVE | Noted: 2021-06-14

## 2024-07-30 PROBLEM — J44.9 CHRONIC OBSTRUCTIVE PULMONARY DISEASE (MULTI): Status: ACTIVE | Noted: 2020-09-14

## 2024-07-30 PROBLEM — E87.20 ACIDOSIS: Status: ACTIVE | Noted: 2020-09-11

## 2024-07-30 PROBLEM — I10 PRIMARY HYPERTENSION: Status: ACTIVE | Noted: 2020-09-11

## 2024-07-30 PROBLEM — N28.9 KIDNEY DISEASE: Status: ACTIVE | Noted: 2020-09-11

## 2024-07-30 PROBLEM — I70.1 ATHEROSCLEROSIS OF RENAL ARTERY (CMS-HCC): Status: ACTIVE | Noted: 2020-09-11

## 2024-07-30 PROBLEM — D63.1 ANEMIA DUE TO CHRONIC KIDNEY DISEASE: Status: ACTIVE | Noted: 2020-09-11

## 2024-07-30 PROBLEM — N18.9 ANEMIA DUE TO CHRONIC KIDNEY DISEASE: Status: ACTIVE | Noted: 2020-09-11

## 2024-07-30 PROBLEM — I73.9 PERIPHERAL ARTERY DISEASE (CMS-HCC): Status: ACTIVE | Noted: 2024-07-30

## 2024-07-30 PROBLEM — N39.0 URINARY TRACT INFECTIOUS DISEASE: Status: ACTIVE | Noted: 2020-09-11

## 2024-07-30 PROBLEM — N25.81 HYPERPARATHYROIDISM DUE TO RENAL INSUFFICIENCY (MULTI): Status: ACTIVE | Noted: 2020-09-11

## 2024-07-30 PROBLEM — E78.5 HYPERLIPIDEMIA: Status: ACTIVE | Noted: 2024-07-30

## 2024-07-30 PROBLEM — E55.9 VITAMIN D DEFICIENCY: Status: ACTIVE | Noted: 2020-09-11

## 2024-07-30 PROBLEM — I73.9 PERIPHERAL ARTERIAL DISEASE (CMS-HCC): Status: ACTIVE | Noted: 2023-07-07

## 2024-07-30 PROBLEM — E87.6 HYPOKALEMIA: Status: ACTIVE | Noted: 2020-09-11

## 2024-07-30 PROBLEM — I12.9 HYPERTENSIVE RENAL DISEASE: Status: ACTIVE | Noted: 2020-09-11

## 2024-07-30 PROBLEM — E66.9 OBESITY WITH BODY MASS INDEX 30 OR GREATER: Status: ACTIVE | Noted: 2021-06-15

## 2024-07-30 PROBLEM — I25.10 ARTERIOSCLEROSIS OF CORONARY ARTERY: Status: ACTIVE | Noted: 2023-06-22

## 2024-07-30 PROBLEM — L40.9 PSORIASIS: Status: ACTIVE | Noted: 2023-08-08

## 2024-07-31 ENCOUNTER — OFFICE VISIT (OUTPATIENT)
Dept: CARDIOLOGY | Facility: CLINIC | Age: 74
End: 2024-07-31
Payer: MEDICARE

## 2024-07-31 VITALS
DIASTOLIC BLOOD PRESSURE: 54 MMHG | WEIGHT: 194.1 LBS | BODY MASS INDEX: 31.19 KG/M2 | SYSTOLIC BLOOD PRESSURE: 155 MMHG | OXYGEN SATURATION: 98 % | HEIGHT: 66 IN | HEART RATE: 65 BPM

## 2024-07-31 DIAGNOSIS — I10 PRIMARY HYPERTENSION: Primary | ICD-10-CM

## 2024-07-31 PROCEDURE — 3008F BODY MASS INDEX DOCD: CPT | Performed by: NURSE PRACTITIONER

## 2024-07-31 PROCEDURE — 4004F PT TOBACCO SCREEN RCVD TLK: CPT | Performed by: NURSE PRACTITIONER

## 2024-07-31 PROCEDURE — 1160F RVW MEDS BY RX/DR IN RCRD: CPT | Performed by: NURSE PRACTITIONER

## 2024-07-31 PROCEDURE — 1126F AMNT PAIN NOTED NONE PRSNT: CPT | Performed by: NURSE PRACTITIONER

## 2024-07-31 PROCEDURE — 1159F MED LIST DOCD IN RCRD: CPT | Performed by: NURSE PRACTITIONER

## 2024-07-31 PROCEDURE — 3078F DIAST BP <80 MM HG: CPT | Performed by: NURSE PRACTITIONER

## 2024-07-31 PROCEDURE — 99214 OFFICE O/P EST MOD 30 MIN: CPT | Performed by: NURSE PRACTITIONER

## 2024-07-31 PROCEDURE — 3077F SYST BP >= 140 MM HG: CPT | Performed by: NURSE PRACTITIONER

## 2024-07-31 ASSESSMENT — PATIENT HEALTH QUESTIONNAIRE - PHQ9
SUM OF ALL RESPONSES TO PHQ9 QUESTIONS 1 AND 2: 0
1. LITTLE INTEREST OR PLEASURE IN DOING THINGS: NOT AT ALL
2. FEELING DOWN, DEPRESSED OR HOPELESS: NOT AT ALL

## 2024-07-31 ASSESSMENT — ENCOUNTER SYMPTOMS
GASTROINTESTINAL NEGATIVE: 1
RESPIRATORY NEGATIVE: 1
NEUROLOGICAL NEGATIVE: 1
CARDIOVASCULAR NEGATIVE: 1
CONSTITUTIONAL NEGATIVE: 1
MUSCULOSKELETAL NEGATIVE: 1

## 2024-07-31 ASSESSMENT — PAIN SCALES - GENERAL: PAINLEVEL: 0-NO PAIN

## 2024-07-31 NOTE — PROGRESS NOTES
"Chief Complaint:   Follow-up (Here for Holter monitor results.)    History Of Present Illness:    .Ms Macdonald returns in follow up.  She wore a monitor. Denies chest pain, sob, palpitations, but has mild bilateral L>R pedal edema.  Only takes nitroglycerin about twice a month for elevated bp and feeling dizzy.  Has not had any episodes since last visit. Does not want to change atenolol to metoprolol, states it made her legs swell.  Feels amlodipine 10 mg does not make her legs swell and does not want to stop or reduce it.  Was put on clonidine by Dr Nunez and does not want to stop it either.  Feels Dr Knapp agrees with medications at present.  She will see Dr Duckworth for moderate disease in the LLE and mild in the RLE.   Does not want different medications or more medications.  Discussed SGLT2 and again she doesn't want more medicines.   Will send for labs from pcp.           Last Recorded Vitals:  Blood pressure 155/54, pulse 65, height 1.676 m (5' 6\"), weight 88 kg (194 lb 1.6 oz), SpO2 98%.     Past Medical History:  History reviewed. No pertinent past medical history.     Past Surgical History:  History reviewed. No pertinent surgical history.    Social History:  Social History     Socioeconomic History    Marital status:    Tobacco Use    Smoking status: Every Day     Current packs/day: 0.50     Average packs/day: 0.5 packs/day for 50.2 years (25.1 ttl pk-yrs)     Types: Cigarettes     Start date: 5/6/1974    Smokeless tobacco: Never   Substance and Sexual Activity    Alcohol use: Yes     Comment: occassionally    Drug use: Never       Family History:  Family History   Problem Relation Name Age of Onset    Breast cancer Sister  45    Breast cancer Daughter  47    Breast cancer Maternal Grandmother  81         Allergies:  Ticagrelor, Bactrim [sulfamethoxazole-trimethoprim], and Effexor [venlafaxine]    Outpatient Medications:  Current Outpatient Medications   Medication Sig Dispense Refill    albuterol " (Ventolin HFA) 90 mcg/actuation inhaler Inhale 2 puffs every 6 hours by inhalation route as needed.      ALPRAZolam (Niravam) 1 mg disintegrating tablet Take 1 tablet (1 mg) by mouth 2 times a day.      amLODIPine (Norvasc) 10 mg tablet Take 1 tablet (10 mg) by mouth once daily. 30 tablet 11    aspirin 81 mg EC tablet once every 24 hours.      atenolol (Tenormin) 50 mg tablet Take 1 tablet (50 mg) by mouth once daily.      cloNIDine (Catapres) 0.1 mg tablet TAKE 1 TABLET BY MOUTH THREE TIMES A  tablet 3    clopidogrel (Plavix) 75 mg tablet Take 1 tablet (75 mg) by mouth once daily.      fluticasone-umeclidin-vilanter (Trelegy Ellipta) 100-62.5-25 mcg blister with device Inhale 1 puff once daily.      furosemide (Lasix) 40 mg tablet Take 1 tablet (40 mg) by mouth if needed.      rosuvastatin (Crestor) 40 mg tablet Take 1 tablet (40 mg) by mouth once daily. 30 tablet 11    traMADol (Ultram) 50 mg tablet Take 1 tablet (50 mg) by mouth 2 times a day as needed.      triamcinolone (Kenalog) 0.1 % cream APPLY TO RASH/PSORIASIS PATCHES TWICE DAILY FOR 2 WEEKS THEN AS NEEDED FOR FLARE UPS       No current facility-administered medications for this visit.        Physical Exam:  Cardiovascular:      PMI at left midclavicular line. Normal rate. Regular rhythm. Normal S1. Normal S2.       Murmurs: There is no murmur.      No gallop.  No click. No rub.   Pulses:     Intact distal pulses.   Edema:     Ankle: 1+ edema of the left ankle and trace edema of the right ankle.        ROS:  Review of Systems   Constitutional: Negative.   Cardiovascular: Negative.    Respiratory: Negative.     Skin: Negative.    Musculoskeletal: Negative.    Gastrointestinal: Negative.    Genitourinary: Negative.    Neurological: Negative.           Last Labs:  CBC -  Lab Results   Component Value Date    WBC 6.8 05/11/2024    HGB 14.1 05/11/2024    HCT 44.7 05/11/2024    MCV 94 05/11/2024     05/11/2024       CMP -  Lab Results   Component  "Value Date    CALCIUM 9.8 05/11/2024    PHOS 3.8 03/25/2024    PROT 8.0 (H) 05/11/2024    ALBUMIN 4.3 05/11/2024    AST 21 05/11/2024    ALT 11 05/11/2024    ALKPHOS 75 05/11/2024    BILITOT 0.3 05/11/2024       LIPID PANEL -   Lab Results   Component Value Date    CHOL 126 (L) 05/18/2023    TRIG 76 05/18/2023    HDL 52 05/18/2023    CHHDL 2.4 05/18/2023       RENAL FUNCTION PANEL -   Lab Results   Component Value Date    GLUCOSE 131 (H) 05/11/2024     05/11/2024    K 3.5 05/11/2024    CL 93 (L) 05/11/2024    CO2 24 05/11/2024    ANIONGAP 16 05/11/2024    BUN 21 05/11/2024    CREATININE 1.50 05/11/2024    CALCIUM 9.8 05/11/2024    PHOS 3.8 03/25/2024    ALBUMIN 4.3 05/11/2024        No results found for: \"BNP\", \"HGBA1C\"      Assessment/Plan   Problem List Items Addressed This Visit    None    1.  Coronary artery disease status post PCI to the RCA 7/7/2023 [facility]..  The patient is an early elderly white female who moved from [place] approximately 2 years ago.  She does have coronary artery disease with a CT coronary calcium score of 2960.  The patient underwent cardiac catheterization on 6/22/2023.  The LMCA was moderately calcified with a 40% distal taper just before bifurcation.  The LAD was a tortuous vessel heavily calcified in the proximal portion with a 20% ostial narrowing with otherwise no significant disease.  The diagonal branch had 2 subbranches the more inferior of which had a 90% focal mid vessel stenosis for which medical management was recommended.  The nondominant LCx had a 100% late mid to distal occlusion but with left to left collaterals not amenable to intervention.  The ramus intermedius was relatively small no significant disease.  The RCA was very tortuous heavily calcified in the proximal and midportion and had a severe 80% tubular stenosis of the mid to distal vessel with TRISTIN-3 grade flow.  The patient subsequently underwent PCI to the RCA on 7/7/2023.  Clinically she is " doing well without any concerning anginal type chest discomfort.   Echo 06/2024  CONCLUSIONS:   1. The left ventricular systolic function is normal, with a visually estimated ejection fraction of 60-65%.   2. Spectral Doppler shows an impaired relaxation pattern of left ventricular diastolic filling.   3. There is moderate concentric left ventricular hypertrophy.   4. Trace to mild mitral valve regurgitation.   5. Mild to moderately elevated right ventricular systolic pressure.   6. Trace to mild tricuspid regurgitation visualized.   7. Mild to moderately elevated pulmonary artery pressure.   8. The estimated PASP is 48 mmHg.   2.  Carotid vascular disease, status post right carotid endarterectomy 2005 left carotid endarterectomy 2021.  3.  Peripheral vascular disease/renal artery stenosis.  The patient has evidently had lower extremity stent procedures performed in the past.  The unknown at this time the patient does have an atrophic right kidney.  She underwent PTA of the left renal artery in the past.  The right renal artery exhibits 100% occlusion.  This is located at site of previous stenting.  The patient had a standard renal ultrasound on 3/25/2024 showing severe right renal atrophy and multiple simple left renal cysts.  The patient will have a repeat renal artery ultrasound performed as well as a screening carotid ultrasound and PVR studies which were done 06/25/2024 and she will be referred to Dr Duckworth.   4.  Hypertension.  Blood pressure is in acceptable range.  Will modify therapy by discontinuing clonidine and beginning amlodipine 10 mg daily.  Consider switching the patient from atenolol to metoprolol in the future, however patient reports having pedal edema on med.  5.  Hyperlipidemia.  Patient is currently on rosuvastatin 5 mg daily.  Given her peripheral vascular disease and coronary artery disease will increase rosuvastatin from 5 to 40 mg daily.  6.  Chronic kidney disease.  Lab work from 5/11/2024  includes a normal CBC creatinine 1.50 proBNP 381.  7.?  CHF.  Patient recently seen [facility] 5/11/2024 with shortness of breath.  CBC normal creatinine for obesity 1 chest x-ray was clear.  CT angiogram was negative for pulmonary embolization with dilated main pulmonary arteries.  Cholelithiasis was also noted incidentally.  8.  History of chronic smoking.   9.  Dizziness.  Wear monitor for two weeks, return in six weeks. Monitor showed only rare ectopy, would prefer to use metoprolol, but patient declines siting bilateral pedal edema.       Will have her return in three months after seeing vascular and nephrology.      Jenna Guevara, APRN-CNP

## 2024-08-01 ENCOUNTER — APPOINTMENT (OUTPATIENT)
Dept: CARDIOLOGY | Facility: CLINIC | Age: 74
End: 2024-08-01
Payer: MEDICARE

## 2024-08-22 ENCOUNTER — APPOINTMENT (OUTPATIENT)
Dept: VASCULAR SURGERY | Facility: CLINIC | Age: 74
End: 2024-08-22
Payer: MEDICARE

## 2024-08-22 ENCOUNTER — HOSPITAL ENCOUNTER (OUTPATIENT)
Dept: RADIOLOGY | Facility: HOSPITAL | Age: 74
Discharge: HOME | End: 2024-08-22
Payer: MEDICARE

## 2024-08-22 DIAGNOSIS — M54.16 RADICULOPATHY, LUMBAR REGION: ICD-10-CM

## 2024-08-22 PROCEDURE — 72110 X-RAY EXAM L-2 SPINE 4/>VWS: CPT

## 2024-09-03 ENCOUNTER — OFFICE VISIT (OUTPATIENT)
Dept: VASCULAR SURGERY | Facility: CLINIC | Age: 74
End: 2024-09-03
Payer: MEDICARE

## 2024-09-03 VITALS — HEART RATE: 61 BPM | RESPIRATION RATE: 18 BRPM | SYSTOLIC BLOOD PRESSURE: 158 MMHG | DIASTOLIC BLOOD PRESSURE: 80 MMHG

## 2024-09-03 DIAGNOSIS — I73.9 PERIPHERAL VASCULAR DISEASE (CMS-HCC): ICD-10-CM

## 2024-09-03 PROCEDURE — 1159F MED LIST DOCD IN RCRD: CPT | Performed by: SURGERY

## 2024-09-03 PROCEDURE — 99215 OFFICE O/P EST HI 40 MIN: CPT | Performed by: SURGERY

## 2024-09-03 PROCEDURE — 4004F PT TOBACCO SCREEN RCVD TLK: CPT | Performed by: SURGERY

## 2024-09-03 PROCEDURE — 3079F DIAST BP 80-89 MM HG: CPT | Performed by: SURGERY

## 2024-09-03 PROCEDURE — 1125F AMNT PAIN NOTED PAIN PRSNT: CPT | Performed by: SURGERY

## 2024-09-03 PROCEDURE — 99205 OFFICE O/P NEW HI 60 MIN: CPT | Performed by: SURGERY

## 2024-09-03 PROCEDURE — 3077F SYST BP >= 140 MM HG: CPT | Performed by: SURGERY

## 2024-09-03 ASSESSMENT — ENCOUNTER SYMPTOMS
LOSS OF SENSATION IN FEET: 0
DEPRESSION: 0
OCCASIONAL FEELINGS OF UNSTEADINESS: 0

## 2024-09-03 ASSESSMENT — LIFESTYLE VARIABLES
HOW OFTEN DO YOU HAVE A DRINK CONTAINING ALCOHOL: MONTHLY OR LESS
SKIP TO QUESTIONS 9-10: 1
AUDIT-C TOTAL SCORE: 1
HOW OFTEN DO YOU HAVE SIX OR MORE DRINKS ON ONE OCCASION: NEVER
HOW MANY STANDARD DRINKS CONTAINING ALCOHOL DO YOU HAVE ON A TYPICAL DAY: 1 OR 2

## 2024-09-03 ASSESSMENT — PAIN SCALES - GENERAL: PAINLEVEL: 5

## 2024-09-03 NOTE — PROGRESS NOTES
Vascular Surgery Consultation, History, Physical    Soledad Macdonald is a 73 y.o. year old female patient.   History: The patient is a 73-year-old woman with hypertension, COPD, PAD, who has had multiple interventions on both legs here and [place] and down in [place].  She now has what she describes as ischemic rest pain of both legs and feet which is worsened by recumbency.  She has very short distance claudication.  She smokes heavily and has struggled to quit smoking in the past.  She does not think another referral to smoking cessation clinic will help.  She had ABIs which show diminished flows at the foot level.  Her TBI on the right is 0.32 and on the left is 0.25.  She has no tissue loss.  Her feet are purple.    History reviewed. No pertinent past medical history.    History reviewed. No pertinent surgical history.    Active Ambulatory Problems     Diagnosis Date Noted    Acidosis 09/11/2020    Anemia due to chronic kidney disease 09/11/2020    Chest pain 07/30/2024    Chronic obstructive pulmonary disease (Multi) 09/14/2020    Dyspnea 07/30/2024    Hyperlipidemia 07/30/2024    Hyperparathyroidism due to renal insufficiency (Multi) 09/11/2020    Hypokalemia 09/11/2020    Hypertensive renal disease 09/11/2020    Kidney disease 09/11/2020    Obesity with body mass index 30 or greater 06/15/2021    Atherosclerotic heart disease of native coronary artery with refractory angina pectoris (CMS-HCC) 06/22/2023    Peripheral arterial disease (CMS-HCC) 07/07/2023    Peripheral artery disease (CMS-HCC) 07/30/2024    Atherosclerosis of renal artery (CMS-HCC) 09/11/2020    Arteriosclerosis of coronary artery 06/22/2023    Primary hypertension 09/11/2020    Psoriasis 08/08/2023    Renal artery stenosis (CMS-[facility]) 09/14/2020    Smoker 06/14/2021    Stage 3 chronic kidney disease (Multi) 09/11/2020    Urinary tract infectious disease 09/11/2020    Vitamin D deficiency 09/11/2020     Resolved Ambulatory Problems     Diagnosis  Date Noted    No Resolved Ambulatory Problems     No Additional Past Medical History       Review of Systems   Constitutional: Negative.    HENT: Negative.     Eyes: Negative.    Respiratory: Negative.     Cardiovascular: Negative.    Gastrointestinal: Negative.    Endocrine: Negative.    Genitourinary: Negative.    Musculoskeletal: Negative.    Skin: Negative.    Allergic/Immunologic: Negative.    Neurological: Negative.    Hematological: Negative.    Psychiatric/Behavioral: Negative.       Allergies   Allergen Reactions    Ticagrelor Anaphylaxis    Bactrim [Sulfamethoxazole-Trimethoprim] Unknown    Effexor [Venlafaxine] Cardiac arrhythmia/arrest       Vitals:   Visit Vitals  /80   Pulse 61   Resp 18     Physical Exam:   Vascular Physical Exam  Constitutional:       General: She is awake.      Appearance: She is morbidly obese.   Eyes:      Extraocular Movements: Extraocular movements intact.      Pupils: Pupils are equal, round, and reactive to light.   Cardiovascular:      Rate and Rhythm: Normal rate.      Pulses:           Femoral pulses are 2+ on the right side and 1+ on the left side.       Dorsalis pedis pulses are non-palpable on the right side and non-palpable on the left side.          Posterior tibial pulses are non-palpable on the right side and non-palpable on the left side.    Pulmonary:      Effort: Pulmonary effort is normal.   Abdominal:      Palpations: Abdomen is soft.   Musculoskeletal:         General: Normal range of motion.      Neck: Full passive range of motion without pain and normal range of motion.   Skin:     General: Skin is warm and dry.   Neurological:      General: No focal deficit present.      Mental Status: She is alert.   Psychiatric:         Mood and Affect: Mood normal.         Labs:  LABS:  CBC with Differential:    Lab Results   Component Value Date    WBC 6.8 05/11/2024    RBC 4.75 05/11/2024    HGB 14.1 05/11/2024    HCT 44.7 05/11/2024     05/11/2024    MCV  "94 05/11/2024    MCH 29.7 05/11/2024    MCHC 31.5 (L) 05/11/2024    RDW 13.5 05/11/2024    NRBC 0.0 05/11/2024    LYMPHOPCT 32.3 05/11/2024    MONOPCT 7.3 05/11/2024    EOSPCT 4.7 05/11/2024    BASOPCT 0.6 05/11/2024    MONOSABS 0.49 05/11/2024    LYMPHSABS 2.18 05/11/2024    EOSABS 0.32 05/11/2024    BASOSABS 0.04 05/11/2024     CMP:    Lab Results   Component Value Date     05/11/2024    K 3.5 05/11/2024    CL 93 (L) 05/11/2024    CO2 24 05/11/2024    BUN 21 05/11/2024    CREATININE 1.50 05/11/2024    GLUCOSE 131 (H) 05/11/2024    PROT 8.0 (H) 05/11/2024    CALCIUM 9.8 05/11/2024    BILITOT 0.3 05/11/2024    ALKPHOS 75 05/11/2024    AST 21 05/11/2024    ALT 11 05/11/2024     BMP:    Lab Results   Component Value Date     05/11/2024    K 3.5 05/11/2024    CL 93 (L) 05/11/2024    CO2 24 05/11/2024    BUN 21 05/11/2024    CREATININE 1.50 05/11/2024    CALCIUM 9.8 05/11/2024    GLUCOSE 131 (H) 05/11/2024     Magnesium:No results found for: \"MG\"  Troponin:  No results found for: \"TROPHS\"  BNP: No results found for: \"BNP\"    Lab Results   Component Value Date    CHOL 126 (L) 05/18/2023    HDL 52 05/18/2023       Imaging:         Assessment/Plan   Diagnoses and all orders for this visit:  Peripheral vascular disease (CMS-HCC)  -     Referral to Vascular Surgery  -     CT angio aorta and bilateral iliofemoral runoff w and or wo IV contrast  -     Case Request Cath Lab: Lower Extremity Intervention      "

## 2024-09-03 NOTE — H&P (VIEW-ONLY)
Vascular Surgery Consultation, History, Physical    Soledad Macdonald is a 73 y.o. year old female patient.   History: The patient is a 73-year-old woman with hypertension, COPD, PAD, who has had multiple interventions on both legs here and [place] and down in [place].  She now has what she describes as ischemic rest pain of both legs and feet which is worsened by recumbency.  She has very short distance claudication.  She smokes heavily and has struggled to quit smoking in the past.  She does not think another referral to smoking cessation clinic will help.  She had ABIs which show diminished flows at the foot level.  Her TBI on the right is 0.32 and on the left is 0.25.  She has no tissue loss.  Her feet are purple.    History reviewed. No pertinent past medical history.    History reviewed. No pertinent surgical history.    Active Ambulatory Problems     Diagnosis Date Noted    Acidosis 09/11/2020    Anemia due to chronic kidney disease 09/11/2020    Chest pain 07/30/2024    Chronic obstructive pulmonary disease (Multi) 09/14/2020    Dyspnea 07/30/2024    Hyperlipidemia 07/30/2024    Hyperparathyroidism due to renal insufficiency (Multi) 09/11/2020    Hypokalemia 09/11/2020    Hypertensive renal disease 09/11/2020    Kidney disease 09/11/2020    Obesity with body mass index 30 or greater 06/15/2021    Atherosclerotic heart disease of native coronary artery with refractory angina pectoris (CMS-HCC) 06/22/2023    Peripheral arterial disease (CMS-HCC) 07/07/2023    Peripheral artery disease (CMS-HCC) 07/30/2024    Atherosclerosis of renal artery (CMS-HCC) 09/11/2020    Arteriosclerosis of coronary artery 06/22/2023    Primary hypertension 09/11/2020    Psoriasis 08/08/2023    Renal artery stenosis (CMS-[facility]) 09/14/2020    Smoker 06/14/2021    Stage 3 chronic kidney disease (Multi) 09/11/2020    Urinary tract infectious disease 09/11/2020    Vitamin D deficiency 09/11/2020     Resolved Ambulatory Problems     Diagnosis  Date Noted    No Resolved Ambulatory Problems     No Additional Past Medical History       Review of Systems   Constitutional: Negative.    HENT: Negative.     Eyes: Negative.    Respiratory: Negative.     Cardiovascular: Negative.    Gastrointestinal: Negative.    Endocrine: Negative.    Genitourinary: Negative.    Musculoskeletal: Negative.    Skin: Negative.    Allergic/Immunologic: Negative.    Neurological: Negative.    Hematological: Negative.    Psychiatric/Behavioral: Negative.       Allergies   Allergen Reactions    Ticagrelor Anaphylaxis    Bactrim [Sulfamethoxazole-Trimethoprim] Unknown    Effexor [Venlafaxine] Cardiac arrhythmia/arrest       Vitals:   Visit Vitals  /80   Pulse 61   Resp 18     Physical Exam:   Vascular Physical Exam  Constitutional:       General: She is awake.      Appearance: She is morbidly obese.   Eyes:      Extraocular Movements: Extraocular movements intact.      Pupils: Pupils are equal, round, and reactive to light.   Cardiovascular:      Rate and Rhythm: Normal rate.      Pulses:           Femoral pulses are 2+ on the right side and 1+ on the left side.       Dorsalis pedis pulses are non-palpable on the right side and non-palpable on the left side.          Posterior tibial pulses are non-palpable on the right side and non-palpable on the left side.    Pulmonary:      Effort: Pulmonary effort is normal.   Abdominal:      Palpations: Abdomen is soft.   Musculoskeletal:         General: Normal range of motion.      Neck: Full passive range of motion without pain and normal range of motion.   Skin:     General: Skin is warm and dry.   Neurological:      General: No focal deficit present.      Mental Status: She is alert.   Psychiatric:         Mood and Affect: Mood normal.         Labs:  LABS:  CBC with Differential:    Lab Results   Component Value Date    WBC 6.8 05/11/2024    RBC 4.75 05/11/2024    HGB 14.1 05/11/2024    HCT 44.7 05/11/2024     05/11/2024    MCV  "94 05/11/2024    MCH 29.7 05/11/2024    MCHC 31.5 (L) 05/11/2024    RDW 13.5 05/11/2024    NRBC 0.0 05/11/2024    LYMPHOPCT 32.3 05/11/2024    MONOPCT 7.3 05/11/2024    EOSPCT 4.7 05/11/2024    BASOPCT 0.6 05/11/2024    MONOSABS 0.49 05/11/2024    LYMPHSABS 2.18 05/11/2024    EOSABS 0.32 05/11/2024    BASOSABS 0.04 05/11/2024     CMP:    Lab Results   Component Value Date     05/11/2024    K 3.5 05/11/2024    CL 93 (L) 05/11/2024    CO2 24 05/11/2024    BUN 21 05/11/2024    CREATININE 1.50 05/11/2024    GLUCOSE 131 (H) 05/11/2024    PROT 8.0 (H) 05/11/2024    CALCIUM 9.8 05/11/2024    BILITOT 0.3 05/11/2024    ALKPHOS 75 05/11/2024    AST 21 05/11/2024    ALT 11 05/11/2024     BMP:    Lab Results   Component Value Date     05/11/2024    K 3.5 05/11/2024    CL 93 (L) 05/11/2024    CO2 24 05/11/2024    BUN 21 05/11/2024    CREATININE 1.50 05/11/2024    CALCIUM 9.8 05/11/2024    GLUCOSE 131 (H) 05/11/2024     Magnesium:No results found for: \"MG\"  Troponin:  No results found for: \"TROPHS\"  BNP: No results found for: \"BNP\"    Lab Results   Component Value Date    CHOL 126 (L) 05/18/2023    HDL 52 05/18/2023       Imaging:         Assessment/Plan   Diagnoses and all orders for this visit:  Peripheral vascular disease (CMS-HCC)  -     Referral to Vascular Surgery  -     CT angio aorta and bilateral iliofemoral runoff w and or wo IV contrast  -     Case Request Cath Lab: Lower Extremity Intervention      "

## 2024-09-04 DIAGNOSIS — Z01.818 PRE-OP TESTING: Primary | ICD-10-CM

## 2024-09-04 DIAGNOSIS — I73.9 PVD (PERIPHERAL VASCULAR DISEASE) (CMS-HCC): ICD-10-CM

## 2024-09-12 ENCOUNTER — LAB (OUTPATIENT)
Dept: LAB | Facility: LAB | Age: 74
End: 2024-09-12
Payer: MEDICARE

## 2024-09-12 DIAGNOSIS — Z01.818 PRE-OP TESTING: ICD-10-CM

## 2024-09-12 DIAGNOSIS — I73.9 PVD (PERIPHERAL VASCULAR DISEASE) (CMS-HCC): ICD-10-CM

## 2024-09-12 LAB
ANION GAP SERPL CALC-SCNC: 10 MMOL/L
BUN SERPL-MCNC: 26 MG/DL (ref 8–25)
CALCIUM SERPL-MCNC: 9.6 MG/DL (ref 8.5–10.4)
CHLORIDE SERPL-SCNC: 100 MMOL/L (ref 97–107)
CO2 SERPL-SCNC: 26 MMOL/L (ref 24–31)
CREAT SERPL-MCNC: 1.8 MG/DL (ref 0.4–1.6)
EGFRCR SERPLBLD CKD-EPI 2021: 30 ML/MIN/1.73M*2
ERYTHROCYTE [DISTWIDTH] IN BLOOD BY AUTOMATED COUNT: 13.2 % (ref 11.5–14.5)
GLUCOSE SERPL-MCNC: 92 MG/DL (ref 65–99)
HCT VFR BLD AUTO: 41 % (ref 36–46)
HGB BLD-MCNC: 12.8 G/DL (ref 12–16)
INR PPP: 1 (ref 0.9–1.2)
MCH RBC QN AUTO: 30.1 PG (ref 26–34)
MCHC RBC AUTO-ENTMCNC: 31.2 G/DL (ref 32–36)
MCV RBC AUTO: 97 FL (ref 80–100)
NRBC BLD-RTO: 0 /100 WBCS (ref 0–0)
PLATELET # BLD AUTO: 179 X10*3/UL (ref 150–450)
POTASSIUM SERPL-SCNC: 4.9 MMOL/L (ref 3.4–5.1)
PROTHROMBIN TIME: 10.7 SECONDS (ref 9.3–12.7)
RBC # BLD AUTO: 4.25 X10*6/UL (ref 4–5.2)
SODIUM SERPL-SCNC: 136 MMOL/L (ref 133–145)
WBC # BLD AUTO: 5.7 X10*3/UL (ref 4.4–11.3)

## 2024-09-12 PROCEDURE — 85610 PROTHROMBIN TIME: CPT

## 2024-09-12 PROCEDURE — 80048 BASIC METABOLIC PNL TOTAL CA: CPT

## 2024-09-12 PROCEDURE — 85027 COMPLETE CBC AUTOMATED: CPT

## 2024-09-12 PROCEDURE — 36415 COLL VENOUS BLD VENIPUNCTURE: CPT

## 2024-09-18 ENCOUNTER — HOSPITAL ENCOUNTER (OUTPATIENT)
Dept: RADIOLOGY | Facility: HOSPITAL | Age: 74
End: 2024-09-18
Payer: MEDICARE

## 2024-09-19 DIAGNOSIS — I73.9 PERIPHERAL VASCULAR DISEASE (CMS-HCC): Primary | ICD-10-CM

## 2024-09-24 ENCOUNTER — TELEPHONE (OUTPATIENT)
Dept: VASCULAR SURGERY | Facility: CLINIC | Age: 74
End: 2024-09-24
Payer: MEDICARE

## 2024-09-24 NOTE — TELEPHONE ENCOUNTER
Called patient to review pre op instructions.  Instructed to remain NPO after midnight the night before procedure and that she is to hold furosemide the day of and day after procedure per Dr. Gonzalez and that she will come in 4 hours early for IV fluids.  Reviewed medications that can be taken morning of procedure with a small sip of water.  Patient voiced understanding.

## 2024-09-26 ENCOUNTER — HOSPITAL ENCOUNTER (OUTPATIENT)
Facility: HOSPITAL | Age: 74
Setting detail: OUTPATIENT SURGERY
Discharge: HOME | End: 2024-09-26
Attending: SURGERY | Admitting: SURGERY
Payer: MEDICARE

## 2024-09-26 VITALS
OXYGEN SATURATION: 100 % | SYSTOLIC BLOOD PRESSURE: 191 MMHG | RESPIRATION RATE: 17 BRPM | HEART RATE: 67 BPM | DIASTOLIC BLOOD PRESSURE: 92 MMHG | TEMPERATURE: 97.9 F

## 2024-09-26 DIAGNOSIS — I73.9 PERIPHERAL ARTERY DISEASE (CMS-HCC): Primary | ICD-10-CM

## 2024-09-26 DIAGNOSIS — I73.9 PERIPHERAL VASCULAR DISEASE (CMS-HCC): ICD-10-CM

## 2024-09-26 PROCEDURE — 2500000004 HC RX 250 GENERAL PHARMACY W/ HCPCS (ALT 636 FOR OP/ED): Performed by: SURGERY

## 2024-09-26 PROCEDURE — 99153 MOD SED SAME PHYS/QHP EA: CPT | Performed by: SURGERY

## 2024-09-26 PROCEDURE — C1769 GUIDE WIRE: HCPCS | Performed by: SURGERY

## 2024-09-26 PROCEDURE — 75630 X-RAY AORTA LEG ARTERIES: CPT | Performed by: SURGERY

## 2024-09-26 PROCEDURE — C1894 INTRO/SHEATH, NON-LASER: HCPCS | Performed by: SURGERY

## 2024-09-26 PROCEDURE — 75710 ARTERY X-RAYS ARM/LEG: CPT | Performed by: SURGERY

## 2024-09-26 PROCEDURE — 7100000010 HC PHASE TWO TIME - EACH INCREMENTAL 1 MINUTE: Performed by: SURGERY

## 2024-09-26 PROCEDURE — 2550000001 HC RX 255 CONTRASTS: Performed by: SURGERY

## 2024-09-26 PROCEDURE — 7100000009 HC PHASE TWO TIME - INITIAL BASE CHARGE: Performed by: SURGERY

## 2024-09-26 PROCEDURE — 2500000005 HC RX 250 GENERAL PHARMACY W/O HCPCS: Performed by: SURGERY

## 2024-09-26 PROCEDURE — 96373 THER/PROPH/DIAG INJ IA: CPT | Performed by: SURGERY

## 2024-09-26 PROCEDURE — 99152 MOD SED SAME PHYS/QHP 5/>YRS: CPT | Performed by: SURGERY

## 2024-09-26 PROCEDURE — 76937 US GUIDE VASCULAR ACCESS: CPT | Performed by: SURGERY

## 2024-09-26 PROCEDURE — 36140 INTRO NDL ICATH UPR/LXTR ART: CPT | Performed by: SURGERY

## 2024-09-26 PROCEDURE — 37221 PR REVSC OPN/PRQ ILIAC ART W/STNT PLMT & ANGIOPLSTY: CPT | Performed by: SURGERY

## 2024-09-26 PROCEDURE — 2720000007 HC OR 272 NO HCPCS: Performed by: SURGERY

## 2024-09-26 PROCEDURE — 2780000003 HC OR 278 NO HCPCS: Performed by: SURGERY

## 2024-09-26 PROCEDURE — C1876 STENT, NON-COA/NON-COV W/DEL: HCPCS | Performed by: SURGERY

## 2024-09-26 PROCEDURE — C1760 CLOSURE DEV, VASC: HCPCS | Performed by: SURGERY

## 2024-09-26 PROCEDURE — 2500000004 HC RX 250 GENERAL PHARMACY W/ HCPCS (ALT 636 FOR OP/ED): Performed by: NURSE PRACTITIONER

## 2024-09-26 PROCEDURE — 37221 HC REVASCULARIZE ILIAC ARTERY,ANGIOPLASTY/STENT, INITIAL VESSEL: CPT | Performed by: SURGERY

## 2024-09-26 DEVICE — OMNILINK ELITE VASCULAR BALLOON-EXPANDABLE STENT SYSTEM 7.0 MM X 39 MM X 80 CM OVER-THE-WIRE
Type: IMPLANTABLE DEVICE | Site: ARTERIAL | Status: FUNCTIONAL
Brand: OMNILINK ELITE

## 2024-09-26 RX ORDER — LABETALOL HYDROCHLORIDE 5 MG/ML
INJECTION, SOLUTION INTRAVENOUS AS NEEDED
Status: DISCONTINUED | OUTPATIENT
Start: 2024-09-26 | End: 2024-09-26 | Stop reason: HOSPADM

## 2024-09-26 RX ORDER — HEPARIN SODIUM 1000 [USP'U]/ML
INJECTION, SOLUTION INTRAVENOUS; SUBCUTANEOUS AS NEEDED
Status: DISCONTINUED | OUTPATIENT
Start: 2024-09-26 | End: 2024-09-26 | Stop reason: HOSPADM

## 2024-09-26 RX ORDER — ACETAMINOPHEN 325 MG/1
650 TABLET ORAL EVERY 6 HOURS PRN
Status: DISCONTINUED | OUTPATIENT
Start: 2024-09-26 | End: 2024-09-26 | Stop reason: HOSPADM

## 2024-09-26 RX ORDER — IODIXANOL 320 MG/ML
INJECTION, SOLUTION INTRAVASCULAR AS NEEDED
Status: DISCONTINUED | OUTPATIENT
Start: 2024-09-26 | End: 2024-09-26 | Stop reason: HOSPADM

## 2024-09-26 RX ORDER — LIDOCAINE HYDROCHLORIDE 10 MG/ML
INJECTION, SOLUTION EPIDURAL; INFILTRATION; INTRACAUDAL; PERINEURAL AS NEEDED
Status: DISCONTINUED | OUTPATIENT
Start: 2024-09-26 | End: 2024-09-26 | Stop reason: HOSPADM

## 2024-09-26 RX ORDER — FENTANYL CITRATE 50 UG/ML
INJECTION, SOLUTION INTRAMUSCULAR; INTRAVENOUS AS NEEDED
Status: DISCONTINUED | OUTPATIENT
Start: 2024-09-26 | End: 2024-09-26 | Stop reason: HOSPADM

## 2024-09-26 RX ORDER — MIDAZOLAM HYDROCHLORIDE 1 MG/ML
INJECTION, SOLUTION INTRAMUSCULAR; INTRAVENOUS AS NEEDED
Status: DISCONTINUED | OUTPATIENT
Start: 2024-09-26 | End: 2024-09-26 | Stop reason: HOSPADM

## 2024-09-26 RX ORDER — PROTAMINE SULFATE 10 MG/ML
INJECTION, SOLUTION INTRAVENOUS CONTINUOUS PRN
Status: COMPLETED | OUTPATIENT
Start: 2024-09-26 | End: 2024-09-26

## 2024-09-26 ASSESSMENT — PAIN - FUNCTIONAL ASSESSMENT: PAIN_FUNCTIONAL_ASSESSMENT: 0-10

## 2024-09-26 ASSESSMENT — PAIN SCALES - GENERAL: PAINLEVEL_OUTOF10: 0 - NO PAIN

## 2024-09-26 ASSESSMENT — COLUMBIA-SUICIDE SEVERITY RATING SCALE - C-SSRS
2. HAVE YOU ACTUALLY HAD ANY THOUGHTS OF KILLING YOURSELF?: NO
1. IN THE PAST MONTH, HAVE YOU WISHED YOU WERE DEAD OR WISHED YOU COULD GO TO SLEEP AND NOT WAKE UP?: NO
6. HAVE YOU EVER DONE ANYTHING, STARTED TO DO ANYTHING, OR PREPARED TO DO ANYTHING TO END YOUR LIFE?: NO

## 2024-09-26 NOTE — Clinical Note
Sheath exchanged in the right femoral artery with SHEATH, MEET, W/.038 GUIDEWIRE, 10 CM,  6FR INTRODUCER, 6FR LARRY, 2.5 CM DIALATOR.

## 2024-09-26 NOTE — OP NOTE
Lower Extremity Intervention Patient is scheduled this Thursday and needs normal saline at 100 x 4hours. (B) Operative Note     Date: 2024  OR Location: [facility] Cardiac Cath Lab    Name: Soledad Macdonald, : 1950, Age: 73 y.o., MRN: [id number], Sex: female    Diagnosis  Pre-op Diagnosis      * Peripheral vascular disease (CMS-HCC) [I73.9] Post-op Diagnosis     * Peripheral vascular disease (CMS-HCC) [I73.9]     Procedures  Lower Extremity Intervention Patient is scheduled this Thursday and needs normal saline at 100 x 4hours.  [id number] - CHG ANGIOGRAPHY EXTREMITY BILATERAL RS&I      Surgeons      * Cathy Duckworth - Primary    Resident/Fellow/Other Assistant:  Surgeons and Role:  * No surgeons found with a matching role *    Procedure Summary  Anesthesia: Anesthesia type not filed in the log.  ASA: ASA status not filed in the log.  Anesthesia Staff: No anesthesia staff entered.  Estimated Blood Loss: 5mL  Intra-op Medications:   Administrations occurring from 1238 to 1336 on 24:   Medication Name Total Dose   lidocaine PF (Xylocaine) 10 mg/mL (1 %) injection 10 mL   oxygen (O2) therapy Cannot be calculated   midazolam (Versed) injection 2 mg   fentaNYL PF (Sublimaze) injection 100 mcg   heparin 1,000 unit/mL injection 5,000 Units   protamine injection Cannot be calculated   labetaloL (Normodyne,Trandate) injection 10 mg   iodixanol (VISIPaque) 320 mg iodine/mL injection 70 mL         Intraprocedure I/O Totals       None           Specimen: No specimens collected     Staff:   Circulator: Isidro Camarillo Person: Myron  Circulator: Mirian         Drains and/or Catheters: * None in log *    Tourniquet Times:         Implants:  Implants       Type Name Action Serial No.      Stent STENT, BILIARY, OMNILINK, 7.0MM X 39MM X 80CM - [id number] Implanted               Findings: Severe stenosis of left external iliac artery treated with 7x39 balloon expandable stent, left leg femoropopliteal syystem with diffuse  moderate disease with occlusion of popliteal artery with reconstitution of pereonal artery which provides predominant runoff into the distal leg and foot. Right leg arteriogram showed iliofemoral segment to be diseased but patent. The femoropliteal artery is occluded below the knee and all three tibial vessels occlude althout the peroneal is patent for a length from proximal to mid leg. The anterior tibial artery reconstitutes mid leg and poorly seen but does flow into the foot.     Indications: Soledad Macdonald is an 73 y.o. female who is having surgery for Peripheral vascular disease (CMS-[facility]) [I73.9].     The patient was seen in the preoperative area. The risks, benefits, complications, treatment options, non-operative alternatives, expected recovery and outcomes were discussed with the patient. The possibilities of reaction to medication, pulmonary aspiration, injury to surrounding structures, bleeding, recurrent infection, the need for additional procedures, failure to diagnose a condition, and creating a complication requiring transfusion or operation were discussed with the patient. The patient concurred with the proposed plan, giving informed consent.  The site of surgery was properly noted/marked if necessary per policy. The patient has been actively warmed in preoperative area. Preoperative antibiotics are not indicated. Venous thrombosis prophylaxis are not indicated.    Procedure Details:   Supine position both groins prepped and draped.  Right common femoral artery access under ultrasound guidance through an area of heavy posterior atherosclerotic plaque.  Lidocaine 1% was given as local anesthesia.  Total volume of 5 mL.  Micropuncture access was used with ultrasound guidance and a 5 [place] sheath was initially placed.  The wire was positioned into the abdominal aorta and an aortogram with bilateral iliofemoral runoff was performed.  It showed patency of the aorta and right iliac system with diffuse  mild to moderate atherosclerosis with no dominant stenosis.  The left side has a severe stenosis of the internal iliac artery as well as the external iliac artery essentially jumping flows in the left leg.  The left external iliac stenosis was crossed with a Glidewire and this with a stiff Glidewire and the patient was given 5000 units of heparin and then a sheath exchange performed to a 6 Citizen of [place] by 45 cm sheath positioned into the left iliac system.  The left iliac system confirmed the presence of a 90% stenosis of the left external iliac artery and this was treated with a 7 x 39 cm balloon expandable stent.  There is angiographic resolution of the stenosis here.  The patient previously did not have a palpable left femoral pulse but did.  The left common femoral artery had severe atherosclerosis but no significant stenosis of the common femoral profundofemoral or superficial femoral artery.  The superficial femoral artery was patent down to the popliteal artery which was occluded.  There is reconstitution of a peroneal artery that was the dominant runoff into the foot through collaterals around the ankle.  The right sheath was retracted into the distal external iliac artery and then a right leg arteriogram was performed demonstrating severe plaque of the right common femoral artery without a significant dominant stenosis.  The profundofemoral and superficial femoral artery had diffuse moderate to severe atherosclerosis but no dominant stenosis demonstrated.  The popliteal artery was also occluded on the side as it was on the left.  There was reconstitution of a peroneal artery which ultimately occluded in the mid leg.  Collaterals reconstituted a very thin and likely underfilled anterior tibial artery which provide of a primary runoff into the foot through the dorsalis pedis artery.    Given that the patient complained mostly of claudication and cramping of both leg muscles also with a element of neuropathic pain,  I do not feel that tibial intervention is indicated at this time.  I went to bring her back for flow studies and reassess her in the office.  I explained this to the patient and she is agreeable.        Complications:  None; patient tolerated the procedure well.    Disposition: PACU - hemodynamically stable.  Condition: stable         Additional Details:     Attending Attestation: I was present and scrubbed for the entire procedure.    Cathy Duckworth  Phone Number: [phone]

## 2024-09-26 NOTE — Clinical Note
Vessel(s): left SFA, left popliteal artery and left tibio-peroneal trunk. Injected with hand injections. Multiple views taken.

## 2024-09-26 NOTE — Clinical Note
Report was give by Isidro HEART to [facility] RN  in [facility].

## 2024-09-26 NOTE — Clinical Note
Vessel(s): right CFA, right PFA, right SFA, right popliteal artery, right PTA, right peroneal artery, right AMILCAR and right tibio-peroneal trunk. Injected with hand injections. Multiple views taken.

## 2024-09-27 ASSESSMENT — PAIN SCALES - GENERAL: PAINLEVEL_OUTOF10: 0 - NO PAIN

## 2024-09-30 DIAGNOSIS — I73.9 PAD (PERIPHERAL ARTERY DISEASE) (CMS-HCC): Primary | ICD-10-CM

## 2024-10-03 ENCOUNTER — APPOINTMENT (OUTPATIENT)
Dept: RADIOLOGY | Facility: HOSPITAL | Age: 74
End: 2024-10-03
Payer: MEDICARE

## 2024-10-10 ENCOUNTER — APPOINTMENT (OUTPATIENT)
Dept: VASCULAR MEDICINE | Facility: CLINIC | Age: 74
End: 2024-10-10
Payer: MEDICARE

## 2024-10-22 ENCOUNTER — LAB (OUTPATIENT)
Dept: LAB | Facility: LAB | Age: 74
End: 2024-10-22
Payer: COMMERCIAL

## 2024-10-22 DIAGNOSIS — R80.9 PROTEINURIA, UNSPECIFIED: Primary | ICD-10-CM

## 2024-10-22 DIAGNOSIS — R80.9 PROTEINURIA, UNSPECIFIED: ICD-10-CM

## 2024-10-22 LAB
ALBUMIN SERPL BCP-MCNC: 4.1 G/DL (ref 3.4–5)
ANION GAP SERPL CALCULATED.3IONS-SCNC: 11 MMOL/L (ref 10–20)
APPEARANCE UR: CLEAR
BACTERIA #/AREA URNS AUTO: ABNORMAL /HPF
BILIRUB UR STRIP.AUTO-MCNC: NEGATIVE MG/DL
BUN SERPL-MCNC: 26 MG/DL (ref 6–23)
C4 SERPL-MCNC: 40 MG/DL (ref 10–50)
CALCIUM SERPL-MCNC: 9.4 MG/DL (ref 8.6–10.3)
CHLORIDE SERPL-SCNC: 104 MMOL/L (ref 98–107)
CO2 SERPL-SCNC: 27 MMOL/L (ref 21–32)
COLOR UR: COLORLESS
CREAT SERPL-MCNC: 1.6 MG/DL (ref 0.5–1.05)
CREAT UR-MCNC: 25.9 MG/DL (ref 20–320)
EGFRCR SERPLBLD CKD-EPI 2021: 34 ML/MIN/1.73M*2
ERYTHROCYTE [DISTWIDTH] IN BLOOD BY AUTOMATED COUNT: 13.6 % (ref 11.5–14.5)
GLUCOSE SERPL-MCNC: 100 MG/DL (ref 74–99)
GLUCOSE UR STRIP.AUTO-MCNC: NORMAL MG/DL
HCT VFR BLD AUTO: 40 % (ref 36–46)
HCV AB SER QL: REACTIVE
HGB BLD-MCNC: 12.4 G/DL (ref 12–16)
HIV 1+2 AB+HIV1 P24 AG SERPL QL IA: NONREACTIVE
HOLD SPECIMEN: NORMAL
KETONES UR STRIP.AUTO-MCNC: NEGATIVE MG/DL
LEUKOCYTE ESTERASE UR QL STRIP.AUTO: ABNORMAL
MCH RBC QN AUTO: 29.7 PG (ref 26–34)
MCHC RBC AUTO-ENTMCNC: 31 G/DL (ref 32–36)
MCV RBC AUTO: 96 FL (ref 80–100)
MICROALBUMIN UR-MCNC: 93.3 MG/L
MICROALBUMIN/CREAT UR: 360.2 UG/MG CREAT
NITRITE UR QL STRIP.AUTO: NEGATIVE
NRBC BLD-RTO: 0 /100 WBCS (ref 0–0)
PH UR STRIP.AUTO: 6.5 [PH]
PHOSPHATE SERPL-MCNC: 3.9 MG/DL (ref 2.5–4.9)
PLATELET # BLD AUTO: 204 X10*3/UL (ref 150–450)
POTASSIUM SERPL-SCNC: 5 MMOL/L (ref 3.5–5.3)
PROT SERPL-MCNC: 7.2 G/DL (ref 6.4–8.2)
PROT UR STRIP.AUTO-MCNC: NEGATIVE MG/DL
RBC # BLD AUTO: 4.17 X10*6/UL (ref 4–5.2)
RBC # UR STRIP.AUTO: NEGATIVE /UL
RBC #/AREA URNS AUTO: ABNORMAL /HPF
SODIUM SERPL-SCNC: 137 MMOL/L (ref 136–145)
SP GR UR STRIP.AUTO: 1.01
SQUAMOUS #/AREA URNS AUTO: ABNORMAL /HPF
UROBILINOGEN UR STRIP.AUTO-MCNC: NORMAL MG/DL
WBC # BLD AUTO: 5.7 X10*3/UL (ref 4.4–11.3)
WBC #/AREA URNS AUTO: ABNORMAL /HPF

## 2024-10-22 PROCEDURE — 87522 HEPATITIS C REVRS TRNSCRPJ: CPT

## 2024-10-22 PROCEDURE — 80069 RENAL FUNCTION PANEL: CPT

## 2024-10-22 PROCEDURE — 87086 URINE CULTURE/COLONY COUNT: CPT

## 2024-10-22 PROCEDURE — 84155 ASSAY OF PROTEIN SERUM: CPT

## 2024-10-22 PROCEDURE — 83521 IG LIGHT CHAINS FREE EACH: CPT

## 2024-10-22 PROCEDURE — 87389 HIV-1 AG W/HIV-1&-2 AB AG IA: CPT

## 2024-10-22 PROCEDURE — 82043 UR ALBUMIN QUANTITATIVE: CPT

## 2024-10-22 PROCEDURE — 36415 COLL VENOUS BLD VENIPUNCTURE: CPT

## 2024-10-22 PROCEDURE — 82570 ASSAY OF URINE CREATININE: CPT

## 2024-10-22 PROCEDURE — 86803 HEPATITIS C AB TEST: CPT

## 2024-10-22 PROCEDURE — 81001 URINALYSIS AUTO W/SCOPE: CPT

## 2024-10-22 PROCEDURE — 84165 PROTEIN E-PHORESIS SERUM: CPT | Performed by: INTERNAL MEDICINE

## 2024-10-22 PROCEDURE — 86160 COMPLEMENT ANTIGEN: CPT

## 2024-10-22 PROCEDURE — 85027 COMPLETE CBC AUTOMATED: CPT

## 2024-10-22 PROCEDURE — 84165 PROTEIN E-PHORESIS SERUM: CPT

## 2024-10-23 LAB
BACTERIA UR CULT: NORMAL
KAPPA LC SERPL-MCNC: 4.16 MG/DL (ref 0.33–1.94)
KAPPA LC/LAMBDA SER: 1.46 {RATIO} (ref 0.26–1.65)
LAMBDA LC SERPL-MCNC: 2.85 MG/DL (ref 0.57–2.63)

## 2024-10-24 LAB
ALBUMIN: 3.9 G/DL (ref 3.4–5)
ALPHA 1 GLOBULIN: 0.4 G/DL (ref 0.2–0.6)
ALPHA 2 GLOBULIN: 1 G/DL (ref 0.4–1.1)
BETA GLOBULIN: 0.9 G/DL (ref 0.5–1.2)
GAMMA GLOBULIN: 1 G/DL (ref 0.5–1.4)
HCV RNA SERPL NAA+PROBE-ACNC: NOT DETECTED K[IU]/ML
HCV RNA SERPL NAA+PROBE-LOG IU: NORMAL {LOG_IU}/ML
PATH REVIEW-SERUM PROTEIN ELECTROPHORESIS: NORMAL
PROTEIN ELECTROPHORESIS COMMENT: NORMAL

## 2024-10-28 ENCOUNTER — APPOINTMENT (OUTPATIENT)
Dept: VASCULAR MEDICINE | Facility: CLINIC | Age: 74
End: 2024-10-28
Payer: COMMERCIAL

## 2024-10-28 ENCOUNTER — OFFICE VISIT (OUTPATIENT)
Dept: VASCULAR SURGERY | Facility: CLINIC | Age: 74
End: 2024-10-28
Payer: COMMERCIAL

## 2024-10-28 ENCOUNTER — ANCILLARY PROCEDURE (OUTPATIENT)
Dept: VASCULAR MEDICINE | Facility: CLINIC | Age: 74
End: 2024-10-28
Payer: COMMERCIAL

## 2024-10-28 VITALS
SYSTOLIC BLOOD PRESSURE: 180 MMHG | HEIGHT: 66 IN | HEART RATE: 64 BPM | RESPIRATION RATE: 18 BRPM | WEIGHT: 187 LBS | DIASTOLIC BLOOD PRESSURE: 88 MMHG | BODY MASS INDEX: 30.05 KG/M2

## 2024-10-28 DIAGNOSIS — I73.9 PERIPHERAL VASCULAR DISEASE (CMS-HCC): Primary | ICD-10-CM

## 2024-10-28 DIAGNOSIS — I73.9 PERIPHERAL ARTERY DISEASE (CMS-HCC): ICD-10-CM

## 2024-10-28 PROCEDURE — 1126F AMNT PAIN NOTED NONE PRSNT: CPT | Performed by: SURGERY

## 2024-10-28 PROCEDURE — 1159F MED LIST DOCD IN RCRD: CPT | Performed by: SURGERY

## 2024-10-28 PROCEDURE — 3008F BODY MASS INDEX DOCD: CPT | Performed by: SURGERY

## 2024-10-28 PROCEDURE — 93923 UPR/LXTR ART STDY 3+ LVLS: CPT

## 2024-10-28 PROCEDURE — 3077F SYST BP >= 140 MM HG: CPT | Performed by: SURGERY

## 2024-10-28 PROCEDURE — 93923 UPR/LXTR ART STDY 3+ LVLS: CPT | Performed by: SURGERY

## 2024-10-28 PROCEDURE — 99212 OFFICE O/P EST SF 10 MIN: CPT | Mod: 25 | Performed by: SURGERY

## 2024-10-28 PROCEDURE — 4004F PT TOBACCO SCREEN RCVD TLK: CPT | Performed by: SURGERY

## 2024-10-28 PROCEDURE — 3079F DIAST BP 80-89 MM HG: CPT | Performed by: SURGERY

## 2024-10-28 PROCEDURE — 99212 OFFICE O/P EST SF 10 MIN: CPT | Performed by: SURGERY

## 2024-10-28 RX ORDER — NITROGLYCERIN 0.4 MG/1
TABLET SUBLINGUAL
COMMUNITY

## 2024-10-28 RX ORDER — AMITRIPTYLINE HYDROCHLORIDE 10 MG/1
10 TABLET, FILM COATED ORAL NIGHTLY
COMMUNITY
Start: 2024-02-07

## 2024-10-28 ASSESSMENT — LIFESTYLE VARIABLES
AUDIT-C TOTAL SCORE: 0
HOW OFTEN DO YOU HAVE SIX OR MORE DRINKS ON ONE OCCASION: NEVER
HOW MANY STANDARD DRINKS CONTAINING ALCOHOL DO YOU HAVE ON A TYPICAL DAY: PATIENT DOES NOT DRINK
HOW OFTEN DO YOU HAVE A DRINK CONTAINING ALCOHOL: NEVER
SKIP TO QUESTIONS 9-10: 1

## 2024-10-28 ASSESSMENT — PAIN SCALES - GENERAL: PAINLEVEL_OUTOF10: 0-NO PAIN

## 2024-10-28 ASSESSMENT — ENCOUNTER SYMPTOMS
LOSS OF SENSATION IN FEET: 0
OCCASIONAL FEELINGS OF UNSTEADINESS: 0
DEPRESSION: 0

## 2024-11-08 ENCOUNTER — APPOINTMENT (OUTPATIENT)
Dept: PULMONOLOGY | Facility: CLINIC | Age: 74
End: 2024-11-08
Payer: MEDICARE

## 2024-11-18 PROBLEM — R80.9 ALBUMINURIA: Status: ACTIVE | Noted: 2024-11-18

## 2024-11-18 PROBLEM — N18.4 CHRONIC KIDNEY DISEASE, STAGE 4 (SEVERE) (MULTI): Status: ACTIVE | Noted: 2024-11-18

## 2024-11-18 PROBLEM — R76.8 HEPATITIS C ANTIBODY POSITIVE IN BLOOD: Status: ACTIVE | Noted: 2024-11-18

## 2024-11-19 ENCOUNTER — OFFICE VISIT (OUTPATIENT)
Dept: CARDIOLOGY | Facility: CLINIC | Age: 74
End: 2024-11-19
Payer: COMMERCIAL

## 2024-11-19 VITALS
DIASTOLIC BLOOD PRESSURE: 82 MMHG | HEIGHT: 66 IN | WEIGHT: 182.9 LBS | HEART RATE: 74 BPM | BODY MASS INDEX: 29.39 KG/M2 | OXYGEN SATURATION: 100 % | SYSTOLIC BLOOD PRESSURE: 132 MMHG

## 2024-11-19 DIAGNOSIS — I25.10 ARTERIOSCLEROSIS OF CORONARY ARTERY: ICD-10-CM

## 2024-11-19 DIAGNOSIS — E78.5 HYPERLIPIDEMIA, UNSPECIFIED HYPERLIPIDEMIA TYPE: Primary | ICD-10-CM

## 2024-11-19 DIAGNOSIS — I73.9 PERIPHERAL ARTERIAL DISEASE (CMS-HCC): ICD-10-CM

## 2024-11-19 PROCEDURE — 3079F DIAST BP 80-89 MM HG: CPT | Performed by: NURSE PRACTITIONER

## 2024-11-19 PROCEDURE — 3008F BODY MASS INDEX DOCD: CPT | Performed by: NURSE PRACTITIONER

## 2024-11-19 PROCEDURE — 3075F SYST BP GE 130 - 139MM HG: CPT | Performed by: NURSE PRACTITIONER

## 2024-11-19 PROCEDURE — 4004F PT TOBACCO SCREEN RCVD TLK: CPT | Performed by: NURSE PRACTITIONER

## 2024-11-19 PROCEDURE — 1159F MED LIST DOCD IN RCRD: CPT | Performed by: NURSE PRACTITIONER

## 2024-11-19 PROCEDURE — 99214 OFFICE O/P EST MOD 30 MIN: CPT | Performed by: NURSE PRACTITIONER

## 2024-11-19 PROCEDURE — 1160F RVW MEDS BY RX/DR IN RCRD: CPT | Performed by: NURSE PRACTITIONER

## 2024-11-19 PROCEDURE — 1126F AMNT PAIN NOTED NONE PRSNT: CPT | Performed by: NURSE PRACTITIONER

## 2024-11-19 ASSESSMENT — ENCOUNTER SYMPTOMS
CONSTITUTIONAL NEGATIVE: 1
CARDIOVASCULAR NEGATIVE: 1
NEUROLOGICAL NEGATIVE: 1
MUSCULOSKELETAL NEGATIVE: 1
GASTROINTESTINAL NEGATIVE: 1
DEPRESSION: 0
RESPIRATORY NEGATIVE: 1
OCCASIONAL FEELINGS OF UNSTEADINESS: 0
LOSS OF SENSATION IN FEET: 1

## 2024-11-19 ASSESSMENT — PAIN SCALES - GENERAL: PAINLEVEL_OUTOF10: 0-NO PAIN

## 2024-11-19 NOTE — PROGRESS NOTES
"Chief Complaint:   Follow-up    History Of Present Illness:    .Ms Macdonald returns in follow up.  Denies chest pain, sob, palpitations or pedal edema.  Only takes nitroglycerin about twice a month for elevated bp and feeling dizzy.  Has not had any episodes since last visit. Does not want to change atenolol to metoprolol, states it made her legs swell.  Feels amlodipine 10 mg does not make her legs swell and does not want to stop or reduce it.  Was put on clonidine as needed for elevated bp by Dr Nunez and does not want to stop it either.  Feels Dr Knapp agrees with medications at present.  Does not want different medications or more medications.  Discussed SGLT2 and again she doesn't want more medicines.   Labs as ordered.  P at home 130/80's.         Last Recorded Vitals:  Blood pressure 132/82, pulse 74, height 1.676 m (5' 6\"), weight 83 kg (182 lb 14.4 oz), SpO2 100%.     Past Medical History:  History reviewed. No pertinent past medical history.     Past Surgical History:  Past Surgical History:   Procedure Laterality Date    INVASIVE VASCULAR PROCEDURE Bilateral 9/26/2024    Procedure: Lower Extremity Intervention Patient is scheduled this Thursday and needs normal saline at 100 x 4hours.;  Surgeon: Cathy Duckworth MD;  Location: [facility] Cardiac Cath Lab;  Service: Vascular Surgery;  Laterality: Bilateral;       Social History:  Social History     Socioeconomic History    Marital status:    Tobacco Use    Smoking status: Every Day     Current packs/day: 0.50     Average packs/day: 0.5 packs/day for 50.5 years (25.3 ttl pk-yrs)     Types: Cigarettes     Start date: 5/6/1974    Smokeless tobacco: Never   Substance and Sexual Activity    Alcohol use: Not Currently     Comment: occassionally    Drug use: Never       Family History:  Family History   Problem Relation Name Age of Onset    Breast cancer Sister  45    Breast cancer Daughter  47    Breast cancer Maternal Grandmother  81       "   Allergies:  Ticagrelor, Bactrim [sulfamethoxazole-trimethoprim], and Effexor [venlafaxine]    Outpatient Medications:  Current Outpatient Medications   Medication Sig Dispense Refill    albuterol (Ventolin HFA) 90 mcg/actuation inhaler Inhale 2 puffs every 6 hours by inhalation route as needed.      ALPRAZolam (Niravam) 1 mg disintegrating tablet Take 1 tablet (1 mg) by mouth 2 times a day.      aspirin 81 mg EC tablet once every 24 hours.      atenolol (Tenormin) 50 mg tablet Take 1 tablet (50 mg) by mouth once daily.      cloNIDine (Catapres) 0.1 mg tablet TAKE 1 TABLET BY MOUTH THREE TIMES A  tablet 3    clopidogrel (Plavix) 75 mg tablet Take 1 tablet (75 mg) by mouth once daily.      fluticasone-umeclidin-vilanter (Trelegy Ellipta) 100-62.5-25 mcg blister with device Inhale 1 puff once daily.      furosemide (Lasix) 40 mg tablet Take 1 tablet (40 mg) by mouth if needed.      nitroglycerin (Nitrostat) 0.4 mg SL tablet Place under the tongue.      rosuvastatin (Crestor) 40 mg tablet Take 1 tablet (40 mg) by mouth once daily. 30 tablet 11    traMADol (Ultram) 50 mg tablet Take 1 tablet (50 mg) by mouth 2 times a day as needed.      triamcinolone (Kenalog) 0.1 % cream APPLY TO RASH/PSORIASIS PATCHES TWICE DAILY FOR 2 WEEKS THEN AS NEEDED FOR FLARE UPS      amitriptyline (Elavil) 10 mg tablet Take 1 tablet (10 mg) by mouth once daily at bedtime. (Patient not taking: Reported on 10/28/2024)      amLODIPine (Norvasc) 10 mg tablet Take 1 tablet (10 mg) by mouth once daily. (Patient not taking: Reported on 10/28/2024) 30 tablet 11    metoprolol succinate XL (Kapspargo Sprinkle) 100 mg 24 hr capsule 1 capsule (100 mg) once every 24 hours. (Patient not taking: Reported on 10/28/2024)       No current facility-administered medications for this visit.        Physical Exam:  Cardiovascular:      PMI at left midclavicular line. Normal rate. Regular rhythm. Normal S1. Normal S2.       Murmurs: There is no murmur.       "No gallop.  No click. No rub.   Pulses:     Intact distal pulses.   Edema:     Peripheral edema absent.         ROS:  Review of Systems   Constitutional: Negative.   Cardiovascular: Negative.    Respiratory: Negative.     Musculoskeletal: Negative.    Gastrointestinal: Negative.    Genitourinary: Negative.    Neurological: Negative.           Last Labs:  CBC -  Lab Results   Component Value Date    WBC 5.7 10/22/2024    HGB 12.4 10/22/2024    HCT 40.0 10/22/2024    MCV 96 10/22/2024     10/22/2024       CMP -  Lab Results   Component Value Date    CALCIUM 9.4 10/22/2024    PHOS 3.9 10/22/2024    PROT 7.2 10/22/2024    PROT 8.0 (H) 05/11/2024    ALBUMIN 4.1 10/22/2024    AST 21 05/11/2024    ALT 11 05/11/2024    ALKPHOS 75 05/11/2024    BILITOT 0.3 05/11/2024       LIPID PANEL -   Lab Results   Component Value Date    CHOL 126 (L) 05/18/2023    TRIG 76 05/18/2023    HDL 52 05/18/2023    CHHDL 2.4 05/18/2023       RENAL FUNCTION PANEL -   Lab Results   Component Value Date    GLUCOSE 100 (H) 10/22/2024     10/22/2024    K 5.0 10/22/2024     10/22/2024    CO2 27 10/22/2024    ANIONGAP 11 10/22/2024    BUN 26 (H) 10/22/2024    CREATININE 1.60 (H) 10/22/2024    CALCIUM 9.4 10/22/2024    PHOS 3.9 10/22/2024    ALBUMIN 4.1 10/22/2024        No results found for: \"BNP\", \"HGBA1C\"      Assessment/Plan   Problem List Items Addressed This Visit    None    1.  Coronary artery disease status post PCI to the RCA 7/7/2023 [facility]..  The patient is an early elderly white female who moved from [place] approximately 2 years ago.  She does have coronary artery disease with a CT coronary calcium score of 2960.  The patient underwent cardiac catheterization on 6/22/2023.  The LMCA was moderately calcified with a 40% distal taper just before bifurcation.  The LAD was a tortuous vessel heavily calcified in the proximal portion with a 20% ostial narrowing with otherwise no significant disease.  The diagonal " branch had 2 subbranches the more inferior of which had a 90% focal mid vessel stenosis for which medical management was recommended.  The nondominant LCx had a 100% late mid to distal occlusion but with left to left collaterals not amenable to intervention.  The ramus intermedius was relatively small no significant disease.  The RCA was very tortuous heavily calcified in the proximal and midportion and had a severe 80% tubular stenosis of the mid to distal vessel with TRISTIN-3 grade flow.  The patient subsequently underwent PCI to the RCA on 7/7/2023.  Clinically she is doing well without any concerning anginal type chest discomfort.   Echo 06/2024  CONCLUSIONS:   1. The left ventricular systolic function is normal, with a visually estimated ejection fraction of 60-65%.   2. Spectral Doppler shows an impaired relaxation pattern of left ventricular diastolic filling.   3. There is moderate concentric left ventricular hypertrophy.   4. Trace to mild mitral valve regurgitation.   5. Mild to moderately elevated right ventricular systolic pressure.   6. Trace to mild tricuspid regurgitation visualized.   7. Mild to moderately elevated pulmonary artery pressure.   8. The estimated PASP is 48 mmHg.   2.  Carotid vascular disease, status post right carotid endarterectomy 2005 left carotid endarterectomy 2021.  3.  Peripheral vascular disease/renal artery stenosis.  The patient has evidently had lower extremity stent procedures performed in the past.  The unknown at this time the patient does have an atrophic right kidney.  She underwent PTA of the left renal artery in the past.  The right renal artery exhibits 100% occlusion.  This is located at site of previous stenting.  The patient had a standard renal ultrasound on 3/25/2024 showing severe right renal atrophy and multiple simple left renal cysts.  The patient will have a repeat renal artery ultrasound performed as well as a screening carotid ultrasound and PVR studies  which were done 06/25/2024 and she will be referred to Dr Duckworth.   Per Dr Duckworth  Patient returns after left iliac intervention. At the time of angiography I refrain from treating her occluded popliteal artery which extended into the tibial vessels as generally tibial interventions are contraindicated for claudication.   4.  Hypertension.  Blood pressure is in acceptable range.  Will modify therapy by discontinuing clonidine and beginning amlodipine 10 mg daily.  Consider switching the patient from atenolol to metoprolol in the future, however patient reports having pedal edema on med.  5.  Hyperlipidemia.  Patient is currently on rosuvastatin 5 mg daily.  Given her peripheral vascular disease and coronary artery disease will increase rosuvastatin from 5 to 40 mg daily.  6.  Chronic kidney disease.  Lab work from 5/11/2024 includes a normal CBC creatinine 1.50 proBNP 381.  7.?  CHF.  Patient recently seen [facility] 5/11/2024 with shortness of breath.  CBC normal creatinine for obesity 1 chest x-ray was clear.  CT angiogram was negative for pulmonary embolization with dilated main pulmonary arteries.  Cholelithiasis was also noted incidentally.  8.  History of chronic smoking.   9.  Dizziness.  Wear monitor for two weeks, return in six weeks. Monitor showed only rare ectopy, would prefer to use metoprolol, but patient declines siting bilateral pedal edema.       Will have her return in three months after seeing vascular and nephrology.         RENÉE Tejeda-CNP

## 2025-01-06 DIAGNOSIS — I73.9 PERIPHERAL ARTERIAL DISEASE (CMS-HCC): Primary | ICD-10-CM

## 2025-01-06 RX ORDER — CLOPIDOGREL BISULFATE 75 MG/1
75 TABLET ORAL DAILY
Qty: 90 TABLET | Refills: 3 | Status: SHIPPED | OUTPATIENT
Start: 2025-01-06 | End: 2026-01-06

## 2025-01-08 ENCOUNTER — LAB (OUTPATIENT)
Dept: LAB | Facility: LAB | Age: 75
End: 2025-01-08
Payer: COMMERCIAL

## 2025-01-08 DIAGNOSIS — R80.9 PROTEINURIA, UNSPECIFIED: ICD-10-CM

## 2025-01-08 DIAGNOSIS — N18.32 CHRONIC KIDNEY DISEASE, STAGE 3B (MULTI): Primary | ICD-10-CM

## 2025-01-08 LAB
ALBUMIN SERPL BCP-MCNC: 4.2 G/DL (ref 3.4–5)
ALP SERPL-CCNC: 58 U/L (ref 33–136)
ALT SERPL W P-5'-P-CCNC: 9 U/L (ref 7–45)
ANION GAP SERPL CALCULATED.3IONS-SCNC: 14 MMOL/L (ref 10–20)
APPEARANCE UR: CLEAR
AST SERPL W P-5'-P-CCNC: 18 U/L (ref 9–39)
BACTERIA #/AREA URNS AUTO: ABNORMAL /HPF
BILIRUB SERPL-MCNC: 0.4 MG/DL (ref 0–1.2)
BILIRUB UR STRIP.AUTO-MCNC: NEGATIVE MG/DL
BUN SERPL-MCNC: 28 MG/DL (ref 6–23)
C3 SERPL-MCNC: 150 MG/DL (ref 87–200)
C4 SERPL-MCNC: 41 MG/DL (ref 10–50)
CALCIUM SERPL-MCNC: 9 MG/DL (ref 8.6–10.3)
CHLORIDE SERPL-SCNC: 101 MMOL/L (ref 98–107)
CO2 SERPL-SCNC: 24 MMOL/L (ref 21–32)
COLOR UR: COLORLESS
CREAT SERPL-MCNC: 1.37 MG/DL (ref 0.5–1.05)
CREAT UR-MCNC: 16.5 MG/DL (ref 20–320)
EGFRCR SERPLBLD CKD-EPI 2021: 41 ML/MIN/1.73M*2
ERYTHROCYTE [DISTWIDTH] IN BLOOD BY AUTOMATED COUNT: 13.8 % (ref 11.5–14.5)
GLUCOSE SERPL-MCNC: 91 MG/DL (ref 74–99)
GLUCOSE UR STRIP.AUTO-MCNC: NORMAL MG/DL
HBV CORE AB SER QL: REACTIVE
HBV SURFACE AB SER-ACNC: <3.1 MIU/ML
HBV SURFACE AG SERPL QL IA: NONREACTIVE
HCT VFR BLD AUTO: 42.2 % (ref 36–46)
HCV AB SER QL: REACTIVE
HGB BLD-MCNC: 13.2 G/DL (ref 12–16)
HOLD SPECIMEN: NORMAL
KETONES UR STRIP.AUTO-MCNC: NEGATIVE MG/DL
LEUKOCYTE ESTERASE UR QL STRIP.AUTO: ABNORMAL
MCH RBC QN AUTO: 29.8 PG (ref 26–34)
MCHC RBC AUTO-ENTMCNC: 31.3 G/DL (ref 32–36)
MCV RBC AUTO: 95 FL (ref 80–100)
MICROALBUMIN UR-MCNC: 88 MG/L
MICROALBUMIN/CREAT UR: 533.3 UG/MG CREAT
MUCOUS THREADS #/AREA URNS AUTO: ABNORMAL /LPF
NITRITE UR QL STRIP.AUTO: NEGATIVE
NRBC BLD-RTO: 0 /100 WBCS (ref 0–0)
PH UR STRIP.AUTO: 6.5 [PH]
PLATELET # BLD AUTO: 186 X10*3/UL (ref 150–450)
POTASSIUM SERPL-SCNC: 5.1 MMOL/L (ref 3.5–5.3)
PROT SERPL-MCNC: 7 G/DL (ref 6.4–8.2)
PROT UR STRIP.AUTO-MCNC: NEGATIVE MG/DL
RBC # BLD AUTO: 4.43 X10*6/UL (ref 4–5.2)
RBC # UR STRIP.AUTO: ABNORMAL /UL
RBC #/AREA URNS AUTO: ABNORMAL /HPF
SODIUM SERPL-SCNC: 134 MMOL/L (ref 136–145)
SP GR UR STRIP.AUTO: 1.01
SQUAMOUS #/AREA URNS AUTO: ABNORMAL /HPF
UROBILINOGEN UR STRIP.AUTO-MCNC: NORMAL MG/DL
WBC # BLD AUTO: 6.8 X10*3/UL (ref 4.4–11.3)
WBC #/AREA URNS AUTO: ABNORMAL /HPF

## 2025-01-08 PROCEDURE — 87086 URINE CULTURE/COLONY COUNT: CPT

## 2025-01-08 PROCEDURE — 86704 HEP B CORE ANTIBODY TOTAL: CPT

## 2025-01-08 PROCEDURE — 86706 HEP B SURFACE ANTIBODY: CPT

## 2025-01-08 PROCEDURE — 85027 COMPLETE CBC AUTOMATED: CPT

## 2025-01-08 PROCEDURE — 82570 ASSAY OF URINE CREATININE: CPT

## 2025-01-08 PROCEDURE — 86160 COMPLEMENT ANTIGEN: CPT

## 2025-01-08 PROCEDURE — 87522 HEPATITIS C REVRS TRNSCRPJ: CPT

## 2025-01-08 PROCEDURE — 82043 UR ALBUMIN QUANTITATIVE: CPT

## 2025-01-08 PROCEDURE — 86803 HEPATITIS C AB TEST: CPT

## 2025-01-08 PROCEDURE — 80053 COMPREHEN METABOLIC PANEL: CPT

## 2025-01-08 PROCEDURE — 81001 URINALYSIS AUTO W/SCOPE: CPT

## 2025-01-08 PROCEDURE — 87340 HEPATITIS B SURFACE AG IA: CPT

## 2025-01-09 LAB
HCV RNA SERPL NAA+PROBE-ACNC: NOT DETECTED K[IU]/ML
HCV RNA SERPL NAA+PROBE-LOG IU: NORMAL {LOG_IU}/ML

## 2025-01-10 LAB — BACTERIA UR CULT: NORMAL

## 2025-02-05 ENCOUNTER — APPOINTMENT (OUTPATIENT)
Dept: CARDIOLOGY | Facility: CLINIC | Age: 75
End: 2025-02-05
Payer: COMMERCIAL

## 2025-03-16 DIAGNOSIS — I10 PRIMARY HYPERTENSION: ICD-10-CM

## 2025-03-16 DIAGNOSIS — R94.31 ABNORMAL EKG: ICD-10-CM

## 2025-03-18 RX ORDER — CLONIDINE HYDROCHLORIDE 0.1 MG/1
0.1 TABLET ORAL 3 TIMES DAILY
Qty: 270 TABLET | Refills: 3 | Status: SHIPPED | OUTPATIENT
Start: 2025-03-18 | End: 2026-03-13

## 2025-05-06 ENCOUNTER — APPOINTMENT (OUTPATIENT)
Dept: VASCULAR MEDICINE | Facility: CLINIC | Age: 75
End: 2025-05-06

## 2025-05-06 ENCOUNTER — APPOINTMENT (OUTPATIENT)
Dept: VASCULAR SURGERY | Facility: CLINIC | Age: 75
End: 2025-05-06

## 2025-05-27 ENCOUNTER — APPOINTMENT (OUTPATIENT)
Dept: CARDIOLOGY | Facility: CLINIC | Age: 75
End: 2025-05-27

## (undated) DEVICE — SHEATH, PINNACLE, W/.038 GUIDEWIRE, 10 CM,  6FR INTRODUCER, 6FR DIA, 2.5 CM DIALATOR

## (undated) DEVICE — CATHETER, SOFT VU, NON-BRAID FLUSH, .035, 5FR, 65CM, 6-SIDEHOLE

## (undated) DEVICE — SHEATH, PINNACLE, W/.038 GW 10CM, 5FR INTRODUCER, 2.5 CM DIALATOR

## (undated) DEVICE — SHEATH, 45CM W/DILATOR, 6FR ST STYLE W/CROSS-CUT VALVE

## (undated) DEVICE — MICROINTRODUCER KIT, VSI, 4FR X 40CM, STIFFEN

## (undated) DEVICE — PACK, ANGIO P2, CUSTOM, LAKE

## (undated) DEVICE — CLOSURE SYSTEM, VASCULAR, VASCADE 6/7F VCS

## (undated) DEVICE — GUIDEWIRE, STIFF SHAFT, ANGLE TIP, .035 DIA, 260 CM,  3 CM TIP"

## (undated) DEVICE — INFLATION KIT, ADVANTAGE, ENCORE 26 (1/BOX)